# Patient Record
Sex: FEMALE | Race: WHITE | NOT HISPANIC OR LATINO | Employment: FULL TIME | URBAN - METROPOLITAN AREA
[De-identification: names, ages, dates, MRNs, and addresses within clinical notes are randomized per-mention and may not be internally consistent; named-entity substitution may affect disease eponyms.]

---

## 2023-03-28 LAB
ALANINE AMINOTRANSFERASE (SGPT) (U/L) IN SER/PLAS: 21 U/L (ref 7–45)
ALBUMIN (G/DL) IN SER/PLAS: 4.2 G/DL (ref 3.4–5)
ALKALINE PHOSPHATASE (U/L) IN SER/PLAS: 107 U/L (ref 33–110)
ANION GAP IN SER/PLAS: 13 MMOL/L (ref 10–20)
ASPARTATE AMINOTRANSFERASE (SGOT) (U/L) IN SER/PLAS: 21 U/L (ref 9–39)
BILIRUBIN TOTAL (MG/DL) IN SER/PLAS: 0.6 MG/DL (ref 0–1.2)
CALCIUM (MG/DL) IN SER/PLAS: 9.6 MG/DL (ref 8.6–10.6)
CARBON DIOXIDE, TOTAL (MMOL/L) IN SER/PLAS: 30 MMOL/L (ref 21–32)
CHLORIDE (MMOL/L) IN SER/PLAS: 101 MMOL/L (ref 98–107)
CHOLESTEROL (MG/DL) IN SER/PLAS: 181 MG/DL (ref 0–199)
CHOLESTEROL IN HDL (MG/DL) IN SER/PLAS: 54.2 MG/DL
CHOLESTEROL/HDL RATIO: 3.3
CREATININE (MG/DL) IN SER/PLAS: 0.76 MG/DL (ref 0.5–1.05)
GFR FEMALE: 90 ML/MIN/1.73M2
GLUCOSE (MG/DL) IN SER/PLAS: 118 MG/DL (ref 74–99)
LDL: 82 MG/DL (ref 0–99)
NON HDL CHOLESTEROL: 127 MG/DL
POTASSIUM (MMOL/L) IN SER/PLAS: 4.8 MMOL/L (ref 3.5–5.3)
PROTEIN TOTAL: 6.5 G/DL (ref 6.4–8.2)
SODIUM (MMOL/L) IN SER/PLAS: 139 MMOL/L (ref 136–145)
TRIGLYCERIDE (MG/DL) IN SER/PLAS: 226 MG/DL (ref 0–149)
UREA NITROGEN (MG/DL) IN SER/PLAS: 17 MG/DL (ref 6–23)
VLDL: 45 MG/DL (ref 0–40)

## 2024-01-17 DIAGNOSIS — E78.2 MIXED HYPERLIPIDEMIA: Primary | ICD-10-CM

## 2024-01-17 RX ORDER — EZETIMIBE 10 MG/1
10 TABLET ORAL DAILY
Qty: 90 TABLET | Refills: 1 | Status: SHIPPED | OUTPATIENT
Start: 2024-01-17

## 2024-02-23 PROBLEM — F41.9 ANXIETY AND DEPRESSION: Status: ACTIVE | Noted: 2024-02-23

## 2024-02-23 PROBLEM — K43.9 VENTRAL HERNIA WITHOUT OBSTRUCTION OR GANGRENE: Status: ACTIVE | Noted: 2019-02-15

## 2024-02-23 PROBLEM — G47.33 OBSTRUCTIVE SLEEP APNEA, ADULT: Status: ACTIVE | Noted: 2024-02-23

## 2024-02-23 PROBLEM — I25.10 CORONARY ARTERY DISEASE: Status: ACTIVE | Noted: 2024-02-23

## 2024-02-23 PROBLEM — I25.10 CORONARY ARTERY DISEASE: Chronic | Status: ACTIVE | Noted: 2024-02-23

## 2024-02-23 PROBLEM — I10 BENIGN ESSENTIAL HYPERTENSION: Status: ACTIVE | Noted: 2024-02-23

## 2024-02-23 PROBLEM — J30.2 SEASONAL ALLERGIES: Status: ACTIVE | Noted: 2024-02-23

## 2024-02-23 PROBLEM — E78.00 HYPERCHOLESTEROLEMIA: Status: ACTIVE | Noted: 2024-02-23

## 2024-02-23 PROBLEM — R60.9 EDEMA: Status: ACTIVE | Noted: 2024-02-23

## 2024-02-23 PROBLEM — E66.01 MORBID OBESITY (MULTI): Status: ACTIVE | Noted: 2024-02-23

## 2024-02-23 PROBLEM — E78.00 HYPERCHOLESTEROLEMIA: Chronic | Status: ACTIVE | Noted: 2024-02-23

## 2024-02-23 PROBLEM — R59.0 LYMPHADENOPATHY, AXILLARY: Status: ACTIVE | Noted: 2024-02-23

## 2024-02-23 PROBLEM — F32.A ANXIETY AND DEPRESSION: Status: ACTIVE | Noted: 2024-02-23

## 2024-02-26 ENCOUNTER — LAB (OUTPATIENT)
Dept: LAB | Facility: LAB | Age: 59
End: 2024-02-26
Payer: COMMERCIAL

## 2024-02-26 DIAGNOSIS — E78.00 HYPERCHOLESTEROLEMIA: Chronic | ICD-10-CM

## 2024-02-26 LAB
ALBUMIN SERPL BCP-MCNC: 4.1 G/DL (ref 3.4–5)
ALP SERPL-CCNC: 115 U/L (ref 33–136)
ALT SERPL W P-5'-P-CCNC: 26 U/L (ref 7–45)
ANION GAP SERPL CALC-SCNC: 16 MMOL/L (ref 10–20)
AST SERPL W P-5'-P-CCNC: 25 U/L (ref 9–39)
BILIRUB SERPL-MCNC: 0.9 MG/DL (ref 0–1.2)
BUN SERPL-MCNC: 15 MG/DL (ref 6–23)
CALCIUM SERPL-MCNC: 9.4 MG/DL (ref 8.6–10.6)
CHLORIDE SERPL-SCNC: 99 MMOL/L (ref 98–107)
CHOLEST SERPL-MCNC: 158 MG/DL (ref 0–199)
CHOLESTEROL/HDL RATIO: 3.5
CO2 SERPL-SCNC: 29 MMOL/L (ref 21–32)
CREAT SERPL-MCNC: 0.67 MG/DL (ref 0.5–1.05)
EGFRCR SERPLBLD CKD-EPI 2021: >90 ML/MIN/1.73M*2
GLUCOSE SERPL-MCNC: 112 MG/DL (ref 74–99)
HDLC SERPL-MCNC: 45.7 MG/DL
LDLC SERPL CALC-MCNC: 70 MG/DL
NON HDL CHOLESTEROL: 112 MG/DL (ref 0–149)
POTASSIUM SERPL-SCNC: 4.7 MMOL/L (ref 3.5–5.3)
PROT SERPL-MCNC: 6.4 G/DL (ref 6.4–8.2)
SODIUM SERPL-SCNC: 139 MMOL/L (ref 136–145)
TRIGL SERPL-MCNC: 211 MG/DL (ref 0–149)
VLDL: 42 MG/DL (ref 0–40)

## 2024-02-26 PROCEDURE — 36415 COLL VENOUS BLD VENIPUNCTURE: CPT

## 2024-02-26 PROCEDURE — 80053 COMPREHEN METABOLIC PANEL: CPT

## 2024-02-26 PROCEDURE — 80061 LIPID PANEL: CPT

## 2024-02-28 ENCOUNTER — OFFICE VISIT (OUTPATIENT)
Dept: CARDIOLOGY | Facility: CLINIC | Age: 59
End: 2024-02-28
Payer: COMMERCIAL

## 2024-02-28 VITALS
BODY MASS INDEX: 42.9 KG/M2 | SYSTOLIC BLOOD PRESSURE: 144 MMHG | OXYGEN SATURATION: 97 % | WEIGHT: 293 LBS | HEART RATE: 85 BPM | DIASTOLIC BLOOD PRESSURE: 84 MMHG

## 2024-02-28 DIAGNOSIS — E78.00 HYPERCHOLESTEROLEMIA: Primary | Chronic | ICD-10-CM

## 2024-02-28 DIAGNOSIS — I10 BENIGN ESSENTIAL HYPERTENSION: Chronic | ICD-10-CM

## 2024-02-28 DIAGNOSIS — I25.10 CORONARY ARTERY DISEASE INVOLVING NATIVE CORONARY ARTERY OF NATIVE HEART WITHOUT ANGINA PECTORIS: Chronic | ICD-10-CM

## 2024-02-28 PROBLEM — E66.01 MORBID OBESITY (MULTI): Chronic | Status: ACTIVE | Noted: 2024-02-23

## 2024-02-28 PROBLEM — G47.33 OBSTRUCTIVE SLEEP APNEA, ADULT: Chronic | Status: ACTIVE | Noted: 2024-02-23

## 2024-02-28 PROBLEM — J30.2 SEASONAL ALLERGIES: Status: RESOLVED | Noted: 2024-02-23 | Resolved: 2024-02-28

## 2024-02-28 PROBLEM — K43.9 VENTRAL HERNIA WITHOUT OBSTRUCTION OR GANGRENE: Status: RESOLVED | Noted: 2019-02-15 | Resolved: 2024-02-28

## 2024-02-28 PROBLEM — G47.30 SLEEP APNEA: Status: RESOLVED | Noted: 2024-02-28 | Resolved: 2024-02-28

## 2024-02-28 PROBLEM — G47.30 SLEEP APNEA: Status: ACTIVE | Noted: 2024-02-28

## 2024-02-28 PROCEDURE — 99214 OFFICE O/P EST MOD 30 MIN: CPT | Performed by: INTERNAL MEDICINE

## 2024-02-28 PROCEDURE — 3077F SYST BP >= 140 MM HG: CPT | Performed by: INTERNAL MEDICINE

## 2024-02-28 PROCEDURE — 1036F TOBACCO NON-USER: CPT | Performed by: INTERNAL MEDICINE

## 2024-02-28 PROCEDURE — 93000 ELECTROCARDIOGRAM COMPLETE: CPT | Performed by: INTERNAL MEDICINE

## 2024-02-28 PROCEDURE — 3079F DIAST BP 80-89 MM HG: CPT | Performed by: INTERNAL MEDICINE

## 2024-02-28 NOTE — PROGRESS NOTES
Referred by No ref. provider found    HPI  I am seeing Ashley for yearly follow-up.  Overall she is doing okay.  She is lacking motivation.  She is under a lot of stress taking care of her mother-in-law and her mother.  She got elected to the [employer] and [employer] board.  There are days where she sits in her house and is not motivated to do anything.  Clearly she can get out and do things.  No pain of any significance no shortness of breath beyond her baseline.  Weight is and this frustrates her.    Past Medical History:  Problem List Items Addressed This Visit    None       Past Medical History:   Diagnosis Date    Atherosclerotic heart disease of native coronary artery without angina pectoris 05/04/2022    Coronary artery disease    Coronary artery disease 02/23/2024    Elevated CAC 26 Agatston units    Hypercholesterolemia 02/23/2024    CAC 28 (80%)             Past Surgical History:  She has a past surgical history that includes Other surgical history (09/28/2020) and Other surgical history (09/09/2021).      Social History:  She has no history on file for tobacco use, alcohol use, and drug use.    Family History:  No family history on file.     Allergies:  Patient has no known allergies.    Outpatient Medications:  Current Outpatient Medications   Medication Instructions    amLODIPine (NORVASC) 5 mg, oral, Daily    atorvastatin (LIPITOR) 80 mg, oral, Daily    escitalopram (Lexapro) 20 mg tablet TAKE 1 TABLET BY MOUTH EVERY DAY    ezetimibe (ZETIA) 10 mg, oral, Daily    furosemide (Lasix) 20 mg tablet TAKE 1 TABLET BY MOUTH EVERY DAY    losartan (COZAAR) 100 mg, oral, Daily    montelukast (Singulair) 10 mg tablet TAKE 1 TABLET BY MOUTH EVERY DAY        Last Recorded Vitals:  There were no vitals filed for this visit.    Physical Exam    Physical  Patient is alert and oriented x3.  HEENT is unremarkable mucous members are moist  Neck no JVP no bruits upstrokes are full no thyromegaly  Lungs are clear  bilaterally.  No wheezing crackles or rales  Heart regular rhythm normal S1-S2 there is no S3 no murmurs are heard.  Abdomen is soft vessels are positive nontender nondistended no organomegaly no pulsatile masses  Extremities have 1+ edema.  Distal pulses present palpable.  Neuro is grossly nonfocal  Skin has no rashes     Last Labs:  CBC -  Lab Results   Component Value Date    WBC 10.1 09/16/2022    HGB 12.3 09/16/2022    HCT 38.3 09/16/2022    MCV 95 09/16/2022     09/16/2022       CMP -  Lab Results   Component Value Date    CALCIUM 9.4 02/26/2024    PROT 6.4 02/26/2024    ALBUMIN 4.1 02/26/2024    AST 25 02/26/2024    ALT 26 02/26/2024    ALKPHOS 115 02/26/2024    BILITOT 0.9 02/26/2024       LIPID PANEL -   Lab Results   Component Value Date    CHOL 158 02/26/2024    HDL 45.7 02/26/2024    CHHDL 3.5 02/26/2024    VLDL 42 (H) 02/26/2024    TRIG 211 (H) 02/26/2024    NHDL 112 02/26/2024       RENAL FUNCTION PANEL -   Lab Results   Component Value Date    K 4.7 02/26/2024       Lab Results   Component Value Date    HGBA1C 6.4 (H) 06/23/2020     Procedure  ECHO [09/28/2022]: EF 65-70%. SD = impaired relaxation pattern. AV sclerosis.     ECHO [03/02/2021]: EF 60-65%. AV sclerosis.     EX NST [03/02/2021]: 6 min 31 sec (7.70 METs) . . . Normal â€“ 75%     EX NST [11/02/2017]: 5 min 15 sec (7 METs) ... Normal â€“ 69%     TST [04/15/2014]: Positive â€“ hypertensive response to exercise w/BP rising to 210/88     CT [04/15/2014]: 6 mm probable area of focal pleural thickening along the right minor fissure. Nonspecific 4 mm ground-glass nodule RLL      CAC [04/15/2014]: Mildly elev at 26 Agatston units (80th percentile)          Assessment/Plan   1. Hypertension.  Blood pressures today.  At home they are typically in the 130 range.  I am confident with weight loss sodium restriction and exercise this will be well-controlled.    2. CAD. Mildly elevated calcium score 25 units in 2014. Continue statin and Zetia and  aspirin. Stress test 2021 normal.  No other significant cardiac symptoms     3. Hyperlipidemia. LDL is gone up from 63 up to 82. HDL is also gone from 46-54. Blood sugars are 118 and subsequently triglycerides are 226.  2/26/2024 LDL 70 HDL 46 triglycerides 211.  Her lipids are excellent.  This to be rechecked February 2025.    4. Murmur. She has aortic sclerosis there is no stenosis.     5. Shortness of breath.  Mostly related to deconditioning and weight gain.    An EKG will be done today.  Fasting lipids in February 2025.  We discussed the importance as we typically do with exercise.  Motivation is biggest issue.  She has a lot of things going on difficult to get the symptoms.  Mann Pathak MD     Instructions and follow up

## 2024-02-28 NOTE — PATIENT INSTRUCTIONS
1. Hypertension.  Blood pressures today.  At home they are typically in the 130 range.  I am confident with weight loss sodium restriction and exercise this will be well-controlled.    2. CAD. Mildly elevated calcium score 25 units in 2014. Continue statin and Zetia and aspirin. Stress test 2021 normal.  No other significant cardiac symptoms     3. Hyperlipidemia. LDL is gone up from 63 up to 82. HDL is also gone from 46-54. Blood sugars are 118 and subsequently triglycerides are 226.  2/26/2024 LDL 70 HDL 46 triglycerides 211.  Her lipids are excellent.  This to be rechecked February 2025.    4. Murmur. She has aortic sclerosis there is no stenosis.     5. Shortness of breath.  Mostly related to deconditioning and weight gain.    An EKG will be done today.  Fasting lipids in February 2025.  We discussed the importance as we typically do with exercise.  Motivation is biggest issue.  She has a lot of things going on difficult to get the symptoms.

## 2024-03-13 DIAGNOSIS — I10 HTN (HYPERTENSION), BENIGN: ICD-10-CM

## 2024-03-13 RX ORDER — FUROSEMIDE 20 MG/1
TABLET ORAL
Qty: 90 TABLET | Refills: 0 | Status: SHIPPED | OUTPATIENT
Start: 2024-03-13

## 2024-05-17 DIAGNOSIS — F41.9 ANXIETY: ICD-10-CM

## 2024-05-17 DIAGNOSIS — E78.5 HYPERLIPIDEMIA, UNSPECIFIED HYPERLIPIDEMIA TYPE: ICD-10-CM

## 2024-05-17 RX ORDER — LOSARTAN POTASSIUM 100 MG/1
100 TABLET ORAL DAILY
Qty: 90 TABLET | Refills: 1 | Status: SHIPPED | OUTPATIENT
Start: 2024-05-17

## 2024-05-17 RX ORDER — ESCITALOPRAM OXALATE 20 MG/1
TABLET ORAL
Qty: 90 TABLET | Refills: 0 | Status: SHIPPED | OUTPATIENT
Start: 2024-05-17

## 2024-05-17 RX ORDER — ATORVASTATIN CALCIUM 80 MG/1
80 TABLET, FILM COATED ORAL DAILY
Qty: 90 TABLET | Refills: 1 | Status: SHIPPED | OUTPATIENT
Start: 2024-05-17

## 2024-06-28 DIAGNOSIS — J45.20 MILD INTERMITTENT ASTHMA, UNSPECIFIED WHETHER COMPLICATED (HHS-HCC): ICD-10-CM

## 2024-06-28 RX ORDER — MONTELUKAST SODIUM 10 MG/1
TABLET ORAL
Qty: 90 TABLET | Refills: 0 | Status: SHIPPED | OUTPATIENT
Start: 2024-06-28

## 2024-07-15 DIAGNOSIS — F41.9 ANXIETY: ICD-10-CM

## 2024-07-15 DIAGNOSIS — J45.20 MILD INTERMITTENT ASTHMA, UNSPECIFIED WHETHER COMPLICATED (HHS-HCC): ICD-10-CM

## 2024-07-16 DIAGNOSIS — I10 HTN (HYPERTENSION), BENIGN: ICD-10-CM

## 2024-07-16 RX ORDER — ESCITALOPRAM OXALATE 20 MG/1
TABLET ORAL
Qty: 90 TABLET | Refills: 0 | Status: SHIPPED | OUTPATIENT
Start: 2024-07-16

## 2024-07-16 RX ORDER — MONTELUKAST SODIUM 10 MG/1
TABLET ORAL
Qty: 90 TABLET | Refills: 0 | Status: SHIPPED | OUTPATIENT
Start: 2024-07-16

## 2024-07-17 DIAGNOSIS — E78.2 MIXED HYPERLIPIDEMIA: Primary | ICD-10-CM

## 2024-07-17 DIAGNOSIS — E78.2 MIXED HYPERLIPIDEMIA: ICD-10-CM

## 2024-07-17 RX ORDER — FUROSEMIDE 20 MG/1
TABLET ORAL
Qty: 90 TABLET | Refills: 0 | Status: SHIPPED | OUTPATIENT
Start: 2024-07-17

## 2024-07-17 RX ORDER — EZETIMIBE 10 MG/1
10 TABLET ORAL DAILY
Qty: 90 TABLET | Refills: 3 | Status: SHIPPED | OUTPATIENT
Start: 2024-07-17 | End: 2024-07-17 | Stop reason: SDUPTHER

## 2024-07-18 RX ORDER — EZETIMIBE 10 MG/1
10 TABLET ORAL DAILY
Qty: 90 TABLET | Refills: 3 | Status: SHIPPED | OUTPATIENT
Start: 2024-07-18

## 2024-07-25 ENCOUNTER — APPOINTMENT (OUTPATIENT)
Dept: PRIMARY CARE | Facility: CLINIC | Age: 59
End: 2024-07-25
Payer: COMMERCIAL

## 2024-07-25 VITALS
BODY MASS INDEX: 41.95 KG/M2 | HEART RATE: 86 BPM | DIASTOLIC BLOOD PRESSURE: 72 MMHG | SYSTOLIC BLOOD PRESSURE: 125 MMHG | OXYGEN SATURATION: 92 % | HEIGHT: 70 IN | WEIGHT: 293 LBS

## 2024-07-25 DIAGNOSIS — R53.83 MALAISE AND FATIGUE: ICD-10-CM

## 2024-07-25 DIAGNOSIS — R53.81 MALAISE AND FATIGUE: ICD-10-CM

## 2024-07-25 DIAGNOSIS — Z12.31 ENCOUNTER FOR SCREENING MAMMOGRAM FOR MALIGNANT NEOPLASM OF BREAST: Primary | ICD-10-CM

## 2024-07-25 DIAGNOSIS — J40 BRONCHITIS: ICD-10-CM

## 2024-07-25 PROCEDURE — 99214 OFFICE O/P EST MOD 30 MIN: CPT | Performed by: FAMILY MEDICINE

## 2024-07-25 PROCEDURE — 3074F SYST BP LT 130 MM HG: CPT | Performed by: FAMILY MEDICINE

## 2024-07-25 PROCEDURE — 3008F BODY MASS INDEX DOCD: CPT | Performed by: FAMILY MEDICINE

## 2024-07-25 PROCEDURE — 3078F DIAST BP <80 MM HG: CPT | Performed by: FAMILY MEDICINE

## 2024-07-25 RX ORDER — ALBUTEROL SULFATE 90 UG/1
2 INHALANT RESPIRATORY (INHALATION) EVERY 4 HOURS PRN
Qty: 8 G | Refills: 3 | Status: SHIPPED | OUTPATIENT
Start: 2024-07-25 | End: 2025-07-25

## 2024-07-25 ASSESSMENT — PATIENT HEALTH QUESTIONNAIRE - PHQ9
SUM OF ALL RESPONSES TO PHQ9 QUESTIONS 1 AND 2: 4
2. FEELING DOWN, DEPRESSED OR HOPELESS: SEVERAL DAYS
8. MOVING OR SPEAKING SO SLOWLY THAT OTHER PEOPLE COULD HAVE NOTICED. OR THE OPPOSITE, BEING SO FIGETY OR RESTLESS THAT YOU HAVE BEEN MOVING AROUND A LOT MORE THAN USUAL: NEARLY EVERY DAY
5. POOR APPETITE OR OVEREATING: NEARLY EVERY DAY
6. FEELING BAD ABOUT YOURSELF - OR THAT YOU ARE A FAILURE OR HAVE LET YOURSELF OR YOUR FAMILY DOWN: SEVERAL DAYS
SUM OF ALL RESPONSES TO PHQ QUESTIONS 1-9: 17
4. FEELING TIRED OR HAVING LITTLE ENERGY: NEARLY EVERY DAY
1. LITTLE INTEREST OR PLEASURE IN DOING THINGS: NEARLY EVERY DAY
9. THOUGHTS THAT YOU WOULD BE BETTER OFF DEAD, OR OF HURTING YOURSELF: NOT AT ALL
7. TROUBLE CONCENTRATING ON THINGS, SUCH AS READING THE NEWSPAPER OR WATCHING TELEVISION: NOT AT ALL
10. IF YOU CHECKED OFF ANY PROBLEMS, HOW DIFFICULT HAVE THESE PROBLEMS MADE IT FOR YOU TO DO YOUR WORK, TAKE CARE OF THINGS AT HOME, OR GET ALONG WITH OTHER PEOPLE: VERY DIFFICULT
3. TROUBLE FALLING OR STAYING ASLEEP OR SLEEPING TOO MUCH: NEARLY EVERY DAY

## 2024-07-25 NOTE — PROGRESS NOTES
Subjective   Patient ID: Ashley Perez is a 59 y.o. female who presents for Joint Swelling.  HPI  Patient have some peripheral edema.  No chest pain shortness of breath or palpitations blood pressure looks good.  Patient needs follow-up and medications and follow-up blood work.  Review of Systems   Constitutional: Negative.    HENT: Negative.     Respiratory: Negative.     Cardiovascular:  Positive for leg swelling.   Gastrointestinal: Negative.    Genitourinary: Negative.    Musculoskeletal: Negative.    Neurological: Negative.        Objective   Physical Exam  Constitutional:       Appearance: Normal appearance.   HENT:      Head: Normocephalic and atraumatic.      Mouth/Throat:      Mouth: Mucous membranes are moist.   Eyes:      Extraocular Movements: Extraocular movements intact.      Pupils: Pupils are equal, round, and reactive to light.   Cardiovascular:      Rate and Rhythm: Normal rate and regular rhythm.   Pulmonary:      Effort: Pulmonary effort is normal.      Breath sounds: Normal breath sounds.   Musculoskeletal:      Cervical back: Normal range of motion and neck supple.      Right lower leg: Edema present.      Left lower leg: Edema present.   Skin:     General: Skin is warm and dry.   Neurological:      Mental Status: She is alert.         Assessment/Plan   Problem List Items Addressed This Visit    None  Visit Diagnoses         Codes    Encounter for screening mammogram for malignant neoplasm of breast    -  Primary Z12.31    Relevant Orders    BI mammo bilateral screening tomosynthesis    Malaise and fatigue     R53.81, R53.83    Relevant Orders    Comprehensive metabolic panel    TSH    Vitamin B12    Bronchitis     J40    Relevant Medications    albuterol (Ventolin HFA) 90 mcg/actuation inhaler                 Luke Roy DO 07/25/24 3:43 PM

## 2024-07-30 ENCOUNTER — OFFICE VISIT (OUTPATIENT)
Dept: SLEEP MEDICINE | Facility: CLINIC | Age: 59
End: 2024-07-30
Payer: COMMERCIAL

## 2024-07-30 VITALS
HEART RATE: 93 BPM | SYSTOLIC BLOOD PRESSURE: 149 MMHG | HEIGHT: 70 IN | WEIGHT: 293 LBS | OXYGEN SATURATION: 96 % | DIASTOLIC BLOOD PRESSURE: 81 MMHG | BODY MASS INDEX: 41.95 KG/M2

## 2024-07-30 DIAGNOSIS — E66.01 MORBID OBESITY (MULTI): Chronic | ICD-10-CM

## 2024-07-30 DIAGNOSIS — G47.33 OBSTRUCTIVE SLEEP APNEA, ADULT: Primary | Chronic | ICD-10-CM

## 2024-07-30 DIAGNOSIS — I10 BENIGN ESSENTIAL HYPERTENSION: Chronic | ICD-10-CM

## 2024-07-30 PROCEDURE — G2211 COMPLEX E/M VISIT ADD ON: HCPCS | Performed by: PHYSICIAN ASSISTANT

## 2024-07-30 PROCEDURE — 99214 OFFICE O/P EST MOD 30 MIN: CPT | Performed by: PHYSICIAN ASSISTANT

## 2024-07-30 PROCEDURE — 3079F DIAST BP 80-89 MM HG: CPT | Performed by: PHYSICIAN ASSISTANT

## 2024-07-30 PROCEDURE — 3077F SYST BP >= 140 MM HG: CPT | Performed by: PHYSICIAN ASSISTANT

## 2024-07-30 PROCEDURE — 1036F TOBACCO NON-USER: CPT | Performed by: PHYSICIAN ASSISTANT

## 2024-07-30 PROCEDURE — 3008F BODY MASS INDEX DOCD: CPT | Performed by: PHYSICIAN ASSISTANT

## 2024-07-30 RX ORDER — ASPIRIN 81 MG/1
81 TABLET ORAL DAILY
COMMUNITY

## 2024-07-30 SDOH — ECONOMIC STABILITY: FOOD INSECURITY: WITHIN THE PAST 12 MONTHS, THE FOOD YOU BOUGHT JUST DIDN'T LAST AND YOU DIDN'T HAVE MONEY TO GET MORE.: NEVER TRUE

## 2024-07-30 SDOH — ECONOMIC STABILITY: FOOD INSECURITY: WITHIN THE PAST 12 MONTHS, YOU WORRIED THAT YOUR FOOD WOULD RUN OUT BEFORE YOU GOT MONEY TO BUY MORE.: NEVER TRUE

## 2024-07-30 ASSESSMENT — PATIENT HEALTH QUESTIONNAIRE - PHQ9
SUM OF ALL RESPONSES TO PHQ9 QUESTIONS 1 AND 2: 1
2. FEELING DOWN, DEPRESSED OR HOPELESS: NOT AT ALL
10. IF YOU CHECKED OFF ANY PROBLEMS, HOW DIFFICULT HAVE THESE PROBLEMS MADE IT FOR YOU TO DO YOUR WORK, TAKE CARE OF THINGS AT HOME, OR GET ALONG WITH OTHER PEOPLE: SOMEWHAT DIFFICULT
1. LITTLE INTEREST OR PLEASURE IN DOING THINGS: SEVERAL DAYS

## 2024-07-30 ASSESSMENT — COLUMBIA-SUICIDE SEVERITY RATING SCALE - C-SSRS
6. HAVE YOU EVER DONE ANYTHING, STARTED TO DO ANYTHING, OR PREPARED TO DO ANYTHING TO END YOUR LIFE?: NO
2. HAVE YOU ACTUALLY HAD ANY THOUGHTS OF KILLING YOURSELF?: NO
1. IN THE PAST MONTH, HAVE YOU WISHED YOU WERE DEAD OR WISHED YOU COULD GO TO SLEEP AND NOT WAKE UP?: NO

## 2024-07-30 ASSESSMENT — LIFESTYLE VARIABLES
AUDIT-C TOTAL SCORE: 3
HOW MANY STANDARD DRINKS CONTAINING ALCOHOL DO YOU HAVE ON A TYPICAL DAY: 1 OR 2
HOW OFTEN DO YOU HAVE A DRINK CONTAINING ALCOHOL: 2-3 TIMES A WEEK
HOW OFTEN DO YOU HAVE SIX OR MORE DRINKS ON ONE OCCASION: NEVER
SKIP TO QUESTIONS 9-10: 1

## 2024-07-30 ASSESSMENT — ENCOUNTER SYMPTOMS
DEPRESSION: 0
OCCASIONAL FEELINGS OF UNSTEADINESS: 0
LOSS OF SENSATION IN FEET: 0

## 2024-07-30 ASSESSMENT — PAIN SCALES - GENERAL: PAINLEVEL: 0-NO PAIN

## 2024-07-30 NOTE — ASSESSMENT & PLAN NOTE
-doing well on pap therapy, sleep apnea well-controlled  -Update supply order  -Avoid driving /operating machinery if drowsy

## 2024-07-30 NOTE — PROGRESS NOTES
Patient: Ashley Perez    [id number]  : 1965 -- AGE 59 y.o.    Provider: Hillary Sorto PA-C     Location [facility] 1   Service Date: 2024              [facility]  Followup Visit Note    HISTORY OF PRESENT ILLNESS     HISTORY OF PRESENT ILLNESS   Ashley Perez is a 59 y.o. female with h/o ANNE-MARIE, obesity, HTN, asthma, depression, CAD  who presents to a [facility] for followup.     PAST SLEEP HISTORY:  HSAT 2022  Connie 3%: 61.6  REI4%: 44.4  O2 chuck: 61%      Assessment and plan from last visit: 2022--  OBSTRUCTIVE SLEEP APNEA - Severe  -declined titration study; may consider overnight oximetry study in future; will reassess symptoms with PAP therapy  -start 5-15cwp AutoPAP with [facility] / requested expedite due to severity   -Diet, exercise, and weight loss were emphasized today in clinic, as were non-supine sleep, avoiding alcohol in the late evening, and driving or operating heavy machinery when sleepy.     HYPERTENSION  -Blood pressure today: 138/84  -Denies CP, SOB, unusual HA, vision changes, dizziness or other symptoms   -Last Echo: EF 65-70% in 2022  -Continue f/u with PCP     OBESITY  -BMI: 41.77  -most recent bicarb: 27 2022  -encouraged healthy diet/exercise  -consider weight management referral at future visit     RTC 31-90 days after PAP set up         Current History    On today's visit, the patient reports sheforgot to follow-up with me after starting cpap in early . She reports she is doing very well at this time.  She is using nasal pillow mask.  She reports that she no longer snoring using CPAP.  She reports that she does have better daytime energy and no longer requiring a daily nap.  Denies any significant continue with CPAP therapy.  Denies any other sleep-related concerns as a visit.  Reports she needs a new supply order.    Sleep schedule:   Bed time: 1-2A  Sleep latency: quickly  "  Nocturnal Awakenings: 0-1 - back to sleep OK   Wake up:10-11A  TST: 8-9 hours     Naps: none       ESS:  3  ROSY:  4  FOSQ: 36    REVIEW OF SYSTEMS     REVIEW OF SYSTEMS  See HPI; all other ROS were reviewed and negative for compliant      ALLERGIES AND MEDICATIONS     ALLERGIES  No Known Allergies    MEDICATIONS: She has a current medication list which includes the following prescription(s): albuterol - Inhale 2 puffs every 4 hours if needed for wheezing or shortness of breath, amlodipine - TAKE 1 TABLET BY MOUTH EVERY DAY, aspirin - Take 1 tablet (81 mg) by mouth once daily, atorvastatin - TAKE 1 TABLET BY MOUTH EVERY DAY, escitalopram - TAKE 1 TABLET BY MOUTH EVERY DAY, ezetimibe - Take 1 tablet (10 mg) by mouth once daily, furosemide - TAKE 1 TABLET BY MOUTH EVERY DAY, losartan - TAKE 1 TABLET BY MOUTH EVERY DAY, and montelukast - TAKE 1 TABLET BY MOUTH EVERY DAY.    PAST MEDICAL HISTORY : She  has a past medical history of Atherosclerotic heart disease of native coronary artery without angina pectoris (05/04/2022), Benign essential hypertension (02/23/2024), Coronary artery disease (02/23/2024), Hypercholesterolemia (02/23/2024), Morbid obesity (Multi) (02/23/2024), and Obstructive sleep apnea, adult (02/23/2024).    PAST SURGICAL HISTORY: She  has a past surgical history that includes Other surgical history (09/28/2020) and Other surgical history (09/09/2021).     FAMILY HISTORY: No changes since previous visit. Otherwise non-contributory as charted.     SOCIAL HISTORY  She  reports that she has never smoked. She has never been exposed to tobacco smoke. She has never used smokeless tobacco. No history on file for alcohol use and drug use.       PHYSICAL EXAM     VITAL SIGNS: /81   Pulse 93   Ht 1.778 m (5' 10\")   Wt 137 kg (301 lb)   SpO2 96%   BMI 43.19 kg/m²        PREVIOUS WEIGHTS:  Wt Readings from Last 3 Encounters:   07/30/24 137 kg (301 lb)   07/25/24 137 kg (301 lb)   02/28/24 136 kg (299 " lb)       Constitutional: Alert and oriented, cooperative, no acute distress  Head: Normocephalic, atraumatic   Cranial Features: No abnormal craniofacial features  Neck: Supple. Trachea midline.  Pulmonary: Non-labored breathing, speaks in full sentences. No cough.    Cardiac: regular rate   Extremities: No clubbing, no edema  Neuromuscular: Cranial nerves grossly intact, no focal deficits      RESULTS/DATA     Bicarbonate (mmol/L)   Date Value   02/26/2024 29   03/27/2023 30   09/16/2022 27   08/22/2022 27       PAP Adherence  A PAP adherence download was obtained and data was reviewed personally today in clinic.        ASSESSMENT/PLAN     Ms. Perez is a 59 y.o. female and she returns in followup to the [facility] for ANNE-MARIE.    Problem List, Orders, Assessment, Recommendations:  Problem List Items Addressed This Visit             ICD-10-CM    Benign essential hypertension (Chronic) I10     -149/81 today  -asymptomatic  -continue close pcp follow up         Morbid obesity (Multi) (Chronic) E66.01     -discussed link to ANNE-MARIE  -encourage healthy diet/exercise         Obstructive sleep apnea, adult - Primary (Chronic) G47.33     -doing well on pap therapy, sleep apnea well-controlled  -Update supply order  -Avoid driving /operating machinery if drowsy         Relevant Orders    Positive Airway Pressure (PAP) Therapy       Disposition    Return to clinic in 4 months  -compliance check

## 2024-08-05 ASSESSMENT — ENCOUNTER SYMPTOMS
GASTROINTESTINAL NEGATIVE: 1
NEUROLOGICAL NEGATIVE: 1
MUSCULOSKELETAL NEGATIVE: 1
RESPIRATORY NEGATIVE: 1
CONSTITUTIONAL NEGATIVE: 1

## 2024-08-06 ENCOUNTER — LAB (OUTPATIENT)
Dept: LAB | Facility: LAB | Age: 59
End: 2024-08-06
Payer: COMMERCIAL

## 2024-08-06 DIAGNOSIS — R53.81 MALAISE AND FATIGUE: ICD-10-CM

## 2024-08-06 DIAGNOSIS — R53.83 MALAISE AND FATIGUE: ICD-10-CM

## 2024-08-06 PROCEDURE — 36415 COLL VENOUS BLD VENIPUNCTURE: CPT

## 2024-08-06 PROCEDURE — 84443 ASSAY THYROID STIM HORMONE: CPT

## 2024-08-06 PROCEDURE — 80053 COMPREHEN METABOLIC PANEL: CPT

## 2024-08-06 PROCEDURE — 82607 VITAMIN B-12: CPT

## 2024-08-07 LAB
ALBUMIN SERPL BCP-MCNC: 4.3 G/DL (ref 3.4–5)
ALP SERPL-CCNC: 126 U/L (ref 33–136)
ALT SERPL W P-5'-P-CCNC: 24 U/L (ref 7–45)
ANION GAP SERPL CALC-SCNC: 20 MMOL/L (ref 10–20)
AST SERPL W P-5'-P-CCNC: 39 U/L (ref 9–39)
BILIRUB SERPL-MCNC: 0.6 MG/DL (ref 0–1.2)
BUN SERPL-MCNC: 20 MG/DL (ref 6–23)
CALCIUM SERPL-MCNC: 9.7 MG/DL (ref 8.6–10.6)
CHLORIDE SERPL-SCNC: 95 MMOL/L (ref 98–107)
CO2 SERPL-SCNC: 31 MMOL/L (ref 21–32)
CREAT SERPL-MCNC: 0.74 MG/DL (ref 0.5–1.05)
EGFRCR SERPLBLD CKD-EPI 2021: >90 ML/MIN/1.73M*2
GLUCOSE SERPL-MCNC: 115 MG/DL (ref 74–99)
POTASSIUM SERPL-SCNC: 5.2 MMOL/L (ref 3.5–5.3)
PROT SERPL-MCNC: 6.9 G/DL (ref 6.4–8.2)
SODIUM SERPL-SCNC: 141 MMOL/L (ref 136–145)
TSH SERPL-ACNC: 1.41 MIU/L (ref 0.44–3.98)
VIT B12 SERPL-MCNC: 268 PG/ML (ref 211–911)

## 2024-09-05 ENCOUNTER — APPOINTMENT (OUTPATIENT)
Dept: PRIMARY CARE | Facility: CLINIC | Age: 59
End: 2024-09-05
Payer: COMMERCIAL

## 2024-09-13 ENCOUNTER — APPOINTMENT (OUTPATIENT)
Dept: PRIMARY CARE | Facility: CLINIC | Age: 59
End: 2024-09-13
Payer: COMMERCIAL

## 2024-09-16 ENCOUNTER — APPOINTMENT (OUTPATIENT)
Dept: PRIMARY CARE | Facility: CLINIC | Age: 59
End: 2024-09-16
Payer: COMMERCIAL

## 2024-09-16 VITALS
WEIGHT: 293 LBS | HEIGHT: 70 IN | BODY MASS INDEX: 41.95 KG/M2 | DIASTOLIC BLOOD PRESSURE: 70 MMHG | HEART RATE: 80 BPM | SYSTOLIC BLOOD PRESSURE: 123 MMHG

## 2024-09-16 DIAGNOSIS — R73.9 HYPERGLYCEMIA: Primary | ICD-10-CM

## 2024-09-16 DIAGNOSIS — J44.9 CHRONIC OBSTRUCTIVE PULMONARY DISEASE, UNSPECIFIED COPD TYPE (MULTI): ICD-10-CM

## 2024-09-16 DIAGNOSIS — E66.01 MORBID OBESITY (MULTI): Chronic | ICD-10-CM

## 2024-09-16 DIAGNOSIS — Z12.11 ENCOUNTER FOR SCREENING FOR MALIGNANT NEOPLASM OF COLON: ICD-10-CM

## 2024-09-16 DIAGNOSIS — R60.0 EDEMA, PERIPHERAL: ICD-10-CM

## 2024-09-16 PROCEDURE — 1036F TOBACCO NON-USER: CPT | Performed by: FAMILY MEDICINE

## 2024-09-16 PROCEDURE — 3008F BODY MASS INDEX DOCD: CPT | Performed by: FAMILY MEDICINE

## 2024-09-16 PROCEDURE — 3074F SYST BP LT 130 MM HG: CPT | Performed by: FAMILY MEDICINE

## 2024-09-16 PROCEDURE — 99214 OFFICE O/P EST MOD 30 MIN: CPT | Performed by: FAMILY MEDICINE

## 2024-09-16 PROCEDURE — 3078F DIAST BP <80 MM HG: CPT | Performed by: FAMILY MEDICINE

## 2024-09-16 RX ORDER — SEMAGLUTIDE 0.25 MG/.5ML
0.25 INJECTION, SOLUTION SUBCUTANEOUS WEEKLY
Qty: 2 ML | Refills: 0 | Status: SHIPPED | OUTPATIENT
Start: 2024-09-16 | End: 2024-10-08

## 2024-09-16 RX ORDER — FLUTICASONE PROPIONATE AND SALMETEROL 250; 50 UG/1; UG/1
1 POWDER RESPIRATORY (INHALATION)
Qty: 60 EACH | Refills: 11 | Status: SHIPPED | OUTPATIENT
Start: 2024-09-16 | End: 2024-09-16 | Stop reason: WASHOUT

## 2024-09-16 RX ORDER — FUROSEMIDE 40 MG/1
40 TABLET ORAL DAILY
Qty: 90 TABLET | Refills: 1 | Status: SHIPPED | OUTPATIENT
Start: 2024-09-16 | End: 2025-03-15

## 2024-09-16 ASSESSMENT — ENCOUNTER SYMPTOMS
MUSCULOSKELETAL NEGATIVE: 1
EYES NEGATIVE: 1
RESPIRATORY NEGATIVE: 1
NEUROLOGICAL NEGATIVE: 1
CARDIOVASCULAR NEGATIVE: 1
CONSTITUTIONAL NEGATIVE: 1

## 2024-09-16 ASSESSMENT — LIFESTYLE VARIABLES: HOW OFTEN DO YOU HAVE A DRINK CONTAINING ALCOHOL: 2-4 TIMES A MONTH

## 2024-09-16 ASSESSMENT — COLUMBIA-SUICIDE SEVERITY RATING SCALE - C-SSRS
2. HAVE YOU ACTUALLY HAD ANY THOUGHTS OF KILLING YOURSELF?: NO
1. IN THE PAST MONTH, HAVE YOU WISHED YOU WERE DEAD OR WISHED YOU COULD GO TO SLEEP AND NOT WAKE UP?: NO
6. HAVE YOU EVER DONE ANYTHING, STARTED TO DO ANYTHING, OR PREPARED TO DO ANYTHING TO END YOUR LIFE?: NO

## 2024-09-16 ASSESSMENT — PATIENT HEALTH QUESTIONNAIRE - PHQ9
SUM OF ALL RESPONSES TO PHQ9 QUESTIONS 1 AND 2: 0
2. FEELING DOWN, DEPRESSED OR HOPELESS: NOT AT ALL
1. LITTLE INTEREST OR PLEASURE IN DOING THINGS: NOT AT ALL

## 2024-09-16 NOTE — PROGRESS NOTES
Subjective   Patient ID: Ashley Perez is a 59 y.o. female who presents for Follow-up (Pt is here for BW F/U ).  HPI  Patient has decrease in her peripheral edema swelling is significantly lessened.  Patient continues to take Lasix 40 mg followed up with cardiology previously seems to be doing okay in that venue.  Patient has hyperglycemia with a BMI over 40 we will get a try and see if we get Mounjaro approved see if we can to help with the elevated sugars as well as decrease some of her weight think this is causing problems with her congestive failure as well as with her chronic peripheral edema.  Review of Systems   Constitutional: Negative.    HENT: Negative.     Eyes: Negative.    Respiratory: Negative.     Cardiovascular: Negative.    Genitourinary: Negative.    Musculoskeletal: Negative.    Skin: Negative.    Neurological: Negative.        Objective   Physical Exam  Constitutional:       Appearance: Normal appearance.   HENT:      Head: Normocephalic.      Mouth/Throat:      Mouth: Mucous membranes are moist.   Cardiovascular:      Rate and Rhythm: Normal rate and regular rhythm.   Pulmonary:      Effort: Pulmonary effort is normal.   Musculoskeletal:         General: Normal range of motion.   Neurological:      Mental Status: She is alert.         Assessment/Plan   Problem List Items Addressed This Visit             ICD-10-CM    Morbid obesity (Multi) (Chronic) E66.01    Relevant Medications    semaglutide, weight loss, (Wegovy) 0.25 mg/0.5 mL pen injector     Other Visit Diagnoses         Codes    Hyperglycemia    -  Primary R73.9    Relevant Medications    semaglutide, weight loss, (Wegovy) 0.25 mg/0.5 mL pen injector    Other Relevant Orders    Hemoglobin A1C    Edema, peripheral     R60.9    Relevant Medications    furosemide (Lasix) 40 mg tablet                 Luke Roy DO 09/16/24 3:03 PM

## 2024-09-19 ENCOUNTER — LAB (OUTPATIENT)
Dept: LAB | Facility: LAB | Age: 59
End: 2024-09-19
Payer: COMMERCIAL

## 2024-09-19 ENCOUNTER — HOSPITAL ENCOUNTER (OUTPATIENT)
Dept: RADIOLOGY | Facility: CLINIC | Age: 59
Discharge: HOME | End: 2024-09-19
Payer: COMMERCIAL

## 2024-09-19 VITALS — WEIGHT: 293 LBS | BODY MASS INDEX: 41.95 KG/M2 | HEIGHT: 70 IN

## 2024-09-19 DIAGNOSIS — R73.9 HYPERGLYCEMIA: ICD-10-CM

## 2024-09-19 DIAGNOSIS — Z12.31 ENCOUNTER FOR SCREENING MAMMOGRAM FOR MALIGNANT NEOPLASM OF BREAST: ICD-10-CM

## 2024-09-19 PROCEDURE — 36415 COLL VENOUS BLD VENIPUNCTURE: CPT

## 2024-09-19 PROCEDURE — 83036 HEMOGLOBIN GLYCOSYLATED A1C: CPT

## 2024-09-19 PROCEDURE — 77067 SCR MAMMO BI INCL CAD: CPT

## 2024-09-20 LAB
EST. AVERAGE GLUCOSE BLD GHB EST-MCNC: 137 MG/DL
HBA1C MFR BLD: 6.4 %

## 2024-10-13 DIAGNOSIS — I10 HTN (HYPERTENSION), BENIGN: ICD-10-CM

## 2024-10-14 RX ORDER — FUROSEMIDE 20 MG/1
TABLET ORAL
Qty: 90 TABLET | Refills: 0 | Status: SHIPPED | OUTPATIENT
Start: 2024-10-14

## 2024-10-17 ENCOUNTER — ANESTHESIA EVENT (OUTPATIENT)
Dept: GASTROENTEROLOGY | Facility: EXTERNAL LOCATION | Age: 59
End: 2024-10-17

## 2024-10-21 DIAGNOSIS — I10 HYPERTENSION, UNSPECIFIED TYPE: ICD-10-CM

## 2024-10-22 RX ORDER — AMLODIPINE BESYLATE 5 MG/1
5 TABLET ORAL DAILY
Qty: 90 TABLET | Refills: 3 | Status: SHIPPED | OUTPATIENT
Start: 2024-10-22

## 2024-10-28 ENCOUNTER — ANESTHESIA (OUTPATIENT)
Dept: GASTROENTEROLOGY | Facility: EXTERNAL LOCATION | Age: 59
End: 2024-10-28

## 2024-10-28 ENCOUNTER — APPOINTMENT (OUTPATIENT)
Dept: GASTROENTEROLOGY | Facility: EXTERNAL LOCATION | Age: 59
End: 2024-10-28
Payer: COMMERCIAL

## 2024-10-28 VITALS
BODY MASS INDEX: 41.95 KG/M2 | SYSTOLIC BLOOD PRESSURE: 116 MMHG | TEMPERATURE: 98.4 F | RESPIRATION RATE: 17 BRPM | OXYGEN SATURATION: 94 % | DIASTOLIC BLOOD PRESSURE: 50 MMHG | HEART RATE: 72 BPM | HEIGHT: 70 IN | WEIGHT: 293 LBS

## 2024-10-28 DIAGNOSIS — Z12.11 ENCOUNTER FOR SCREENING FOR MALIGNANT NEOPLASM OF COLON: Primary | ICD-10-CM

## 2024-10-28 PROCEDURE — 45378 DIAGNOSTIC COLONOSCOPY: CPT | Performed by: INTERNAL MEDICINE

## 2024-10-28 RX ORDER — LIDOCAINE HYDROCHLORIDE 20 MG/ML
INJECTION, SOLUTION INFILTRATION; PERINEURAL AS NEEDED
Status: DISCONTINUED | OUTPATIENT
Start: 2024-10-28 | End: 2024-10-28

## 2024-10-28 RX ORDER — PROPOFOL 10 MG/ML
INJECTION, EMULSION INTRAVENOUS AS NEEDED
Status: DISCONTINUED | OUTPATIENT
Start: 2024-10-28 | End: 2024-10-28

## 2024-10-28 RX ORDER — ALBUTEROL SULFATE 0.83 MG/ML
SOLUTION RESPIRATORY (INHALATION) AS NEEDED
Status: DISCONTINUED | OUTPATIENT
Start: 2024-10-28 | End: 2024-10-28

## 2024-10-28 SDOH — HEALTH STABILITY: MENTAL HEALTH: CURRENT SMOKER: 0

## 2024-10-28 ASSESSMENT — PAIN - FUNCTIONAL ASSESSMENT
PAIN_FUNCTIONAL_ASSESSMENT: 0-10

## 2024-10-28 ASSESSMENT — PAIN SCALES - GENERAL
PAINLEVEL_OUTOF10: 0 - NO PAIN
PAINLEVEL_OUTOF10: 0 - NO PAIN
PAIN_LEVEL: 0
PAINLEVEL_OUTOF10: 0 - NO PAIN

## 2024-11-13 DIAGNOSIS — F41.9 ANXIETY: ICD-10-CM

## 2024-11-13 DIAGNOSIS — E78.5 HYPERLIPIDEMIA, UNSPECIFIED HYPERLIPIDEMIA TYPE: ICD-10-CM

## 2024-11-14 RX ORDER — ATORVASTATIN CALCIUM 80 MG/1
80 TABLET, FILM COATED ORAL DAILY
Qty: 90 TABLET | Refills: 1 | Status: SHIPPED | OUTPATIENT
Start: 2024-11-14

## 2024-11-14 RX ORDER — LOSARTAN POTASSIUM 100 MG/1
100 TABLET ORAL DAILY
Qty: 90 TABLET | Refills: 1 | Status: SHIPPED | OUTPATIENT
Start: 2024-11-14

## 2024-11-14 RX ORDER — ESCITALOPRAM OXALATE 20 MG/1
TABLET ORAL
Qty: 90 TABLET | Refills: 0 | Status: SHIPPED | OUTPATIENT
Start: 2024-11-14

## 2024-12-03 ENCOUNTER — OFFICE VISIT (OUTPATIENT)
Dept: SLEEP MEDICINE | Facility: CLINIC | Age: 59
End: 2024-12-03
Payer: COMMERCIAL

## 2024-12-03 VITALS
RESPIRATION RATE: 18 BRPM | DIASTOLIC BLOOD PRESSURE: 73 MMHG | TEMPERATURE: 98.2 F | HEIGHT: 70 IN | HEART RATE: 85 BPM | WEIGHT: 293 LBS | BODY MASS INDEX: 41.95 KG/M2 | SYSTOLIC BLOOD PRESSURE: 137 MMHG | OXYGEN SATURATION: 93 %

## 2024-12-03 DIAGNOSIS — G47.33 OBSTRUCTIVE SLEEP APNEA, ADULT: Primary | Chronic | ICD-10-CM

## 2024-12-03 DIAGNOSIS — I10 BENIGN ESSENTIAL HYPERTENSION: Chronic | ICD-10-CM

## 2024-12-03 PROCEDURE — 99213 OFFICE O/P EST LOW 20 MIN: CPT | Performed by: PHYSICIAN ASSISTANT

## 2024-12-03 PROCEDURE — 3075F SYST BP GE 130 - 139MM HG: CPT | Performed by: PHYSICIAN ASSISTANT

## 2024-12-03 PROCEDURE — 1036F TOBACCO NON-USER: CPT | Performed by: PHYSICIAN ASSISTANT

## 2024-12-03 PROCEDURE — 3078F DIAST BP <80 MM HG: CPT | Performed by: PHYSICIAN ASSISTANT

## 2024-12-03 PROCEDURE — 3008F BODY MASS INDEX DOCD: CPT | Performed by: PHYSICIAN ASSISTANT

## 2024-12-03 SDOH — ECONOMIC STABILITY: FOOD INSECURITY: WITHIN THE PAST 12 MONTHS, YOU WORRIED THAT YOUR FOOD WOULD RUN OUT BEFORE YOU GOT MONEY TO BUY MORE.: NEVER TRUE

## 2024-12-03 SDOH — ECONOMIC STABILITY: FOOD INSECURITY: WITHIN THE PAST 12 MONTHS, THE FOOD YOU BOUGHT JUST DIDN'T LAST AND YOU DIDN'T HAVE MONEY TO GET MORE.: NEVER TRUE

## 2024-12-03 ASSESSMENT — ENCOUNTER SYMPTOMS
DEPRESSION: 0
OCCASIONAL FEELINGS OF UNSTEADINESS: 0
LOSS OF SENSATION IN FEET: 1

## 2024-12-03 ASSESSMENT — LIFESTYLE VARIABLES
HOW OFTEN DO YOU HAVE SIX OR MORE DRINKS ON ONE OCCASION: NEVER
SKIP TO QUESTIONS 9-10: 1
HOW MANY STANDARD DRINKS CONTAINING ALCOHOL DO YOU HAVE ON A TYPICAL DAY: 1 OR 2
AUDIT-C TOTAL SCORE: 3
HOW OFTEN DO YOU HAVE A DRINK CONTAINING ALCOHOL: 2-3 TIMES A WEEK

## 2024-12-03 ASSESSMENT — COLUMBIA-SUICIDE SEVERITY RATING SCALE - C-SSRS
6. HAVE YOU EVER DONE ANYTHING, STARTED TO DO ANYTHING, OR PREPARED TO DO ANYTHING TO END YOUR LIFE?: NO
1. IN THE PAST MONTH, HAVE YOU WISHED YOU WERE DEAD OR WISHED YOU COULD GO TO SLEEP AND NOT WAKE UP?: NO
2. HAVE YOU ACTUALLY HAD ANY THOUGHTS OF KILLING YOURSELF?: NO

## 2024-12-03 ASSESSMENT — PAIN SCALES - GENERAL: PAINLEVEL_OUTOF10: 0-NO PAIN

## 2024-12-03 NOTE — ASSESSMENT & PLAN NOTE
-elevated today, improved on re-check  -asymptomatic  -encourage close follow up with pcp/cardiology

## 2024-12-03 NOTE — PROGRESS NOTES
" Patient: Ashley Perez    [id number]  : 1965 -- AGE 59 y.o.    Provider: Hillary Sorto PA-C     Location [facility] 1   Service Date: 12/3/2024              [facility]  Followup Visit Note    HISTORY OF PRESENT ILLNESS     HISTORY OF PRESENT ILLNESS   Ashley Perez is a 59 y.o. female with h/o  ANNE-MARIE, obesity, HTN, asthma, depression, CAD  who presents to a [facility] for followup.     PAST SLEEP HISTORY:  HSAT 2022  Connie 3%: 61.6  REI4%: 44.4  O2 chuck: 61%    Assessment and plan from last visit: 2024--   Benign essential hypertension (Chronic) I10        -149/81 today  -asymptomatic  -continue close pcp follow up           Morbid obesity (Multi) (Chronic) E66.01       -discussed link to ANNE-MARIE  -encourage healthy diet/exercise           Obstructive sleep apnea, adult - Primary (Chronic) G47.33       -doing well on pap therapy, sleep apnea well-controlled  -Update supply order  -Avoid driving /operating machinery if drowsy           Relevant Orders     Positive Airway Pressure (PAP) Therapy           Current History    On today's visit, the patient reports:   Benefits with cpap, same as last visit: \"he is using nasal pillow mask. She reports that she no longer snoring using CPAP. She reports that she does have better daytime energy and no longer requiring a daily nap\"  Reports continues to have bad habits, stays up late, may be on computer late and go down a rabit hole. Knows she needs to get better about her routine. Wants to shift her sleep schedule. Motivated to meet compliance, thought she had been doing so.       Sleep schedule:   Bed time: 1-2A  Sleep latency: quickly   Nocturnal Awakenings: 0-1 - back to sleep OK   Wake up:10-11A  TST: 8-9 hours      Naps: none       ESS:  5  ROSY:  7  FOSQ: 37    REVIEW OF SYSTEMS     REVIEW OF SYSTEMS  See HPI; all other ROS were reviewed and negative for " compliant      ALLERGIES AND MEDICATIONS     ALLERGIES  No Known Allergies    MEDICATIONS: She has a current medication list which includes the following prescription(s): albuterol - Inhale 2 puffs every 4 hours if needed for wheezing or shortness of breath, amlodipine - TAKE 1 TABLET BY MOUTH EVERY DAY, aspirin - Take 1 tablet (81 mg) by mouth once daily, atorvastatin - TAKE 1 TABLET BY MOUTH EVERY DAY, escitalopram - TAKE 1 TABLET BY MOUTH EVERY DAY, ezetimibe - Take 1 tablet (10 mg) by mouth once daily, furosemide - Take 1 tablet (40 mg) by mouth once daily, losartan - TAKE 1 TABLET BY MOUTH EVERY DAY, montelukast - TAKE 1 TABLET BY MOUTH EVERY DAY, and wegovy - Inject 0.25 mg under the skin 1 (one) time per week for 4 doses.    PAST MEDICAL HISTORY : She  has a past medical history of Atherosclerotic heart disease of native coronary artery without angina pectoris (05/04/2022), Benign essential hypertension (02/23/2024), Coronary artery disease (02/23/2024), Hypercholesterolemia (02/23/2024), Morbid obesity (Multi) (02/23/2024), and Obstructive sleep apnea, adult (02/23/2024).    PAST SURGICAL HISTORY: She  has a past surgical history that includes Other surgical history (09/28/2020) and Other surgical history (09/09/2021).     FAMILY HISTORY: No changes since previous visit. Otherwise non-contributory as charted.     SOCIAL HISTORY  She  reports that she has never smoked. She has never been exposed to tobacco smoke. She has never used smokeless tobacco. She reports that she does not use drugs. No history on file for alcohol use.       PHYSICAL EXAM     VITAL SIGNS: There were no vitals taken for this visit.       PREVIOUS WEIGHTS:  Wt Readings from Last 3 Encounters:   10/28/24 134 kg (296 lb)   09/19/24 136 kg (300 lb)   09/16/24 133 kg (294 lb)       Constitutional: Alert and oriented, cooperative, no acute distress  Head: Normocephalic, atraumatic   Cranial Features: No abnormal craniofacial features  Neck:  Supple. Trachea midline.  Pulmonary: Non-labored breathing, speaks in full sentences. No cough.    Cardiac: regular rate   Extremities: No clubbing, no edema  Neuromuscular: Cranial nerves grossly intact, no focal deficits      RESULTS/DATA     Bicarbonate (mmol/L)   Date Value   08/06/2024 31   02/26/2024 29   03/27/2023 30   09/16/2022 27   08/22/2022 27       PAP Adherence  A PAP adherence download was obtained and data was reviewed personally today in clinic.        ASSESSMENT/PLAN     Ms. Perez is a 59 y.o. female and she returns in followup to the [facility] for ANNE-MARIE.    Problem List, Orders, Assessment, Recommendations:  Problem List Items Addressed This Visit             ICD-10-CM    Benign essential hypertension (Chronic) I10     -elevated today, improved on re-check  -asymptomatic  -encourage close follow up with pcp/cardiology         Obstructive sleep apnea, adult - Primary (Chronic) G47.33     -doing well on pap therapy, sleep apnea well-controlled  -Update supply order  -Avoid driving /operating machinery if drowsy  -reviewed compliance guidelines today; discussed setting a reminder alarm/ consistent bedtime routine, she is going to work on this          Relevant Orders    Positive Airway Pressure (PAP) Therapy       Disposition    Return to clinic in 6 months

## 2024-12-03 NOTE — ASSESSMENT & PLAN NOTE
-doing well on pap therapy, sleep apnea well-controlled  -Update supply order  -Avoid driving /operating machinery if drowsy  -reviewed compliance guidelines today; discussed setting a reminder alarm/ consistent bedtime routine, she is going to work on this

## 2024-12-23 DIAGNOSIS — J45.20 MILD INTERMITTENT ASTHMA, UNSPECIFIED WHETHER COMPLICATED (HHS-HCC): ICD-10-CM

## 2024-12-23 RX ORDER — MONTELUKAST SODIUM 10 MG/1
TABLET ORAL
Qty: 90 TABLET | Refills: 0 | Status: SHIPPED | OUTPATIENT
Start: 2024-12-23

## 2025-02-15 DIAGNOSIS — F41.9 ANXIETY: ICD-10-CM

## 2025-02-17 RX ORDER — ESCITALOPRAM OXALATE 20 MG/1
TABLET ORAL
Qty: 90 TABLET | Refills: 0 | Status: SHIPPED | OUTPATIENT
Start: 2025-02-17

## 2025-02-26 LAB
ALBUMIN SERPL-MCNC: 4.2 G/DL (ref 3.6–5.1)
ALP SERPL-CCNC: 136 U/L (ref 37–153)
ALT SERPL-CCNC: 17 U/L (ref 6–29)
ANION GAP SERPL CALCULATED.4IONS-SCNC: 11 MMOL/L (CALC) (ref 7–17)
AST SERPL-CCNC: 23 U/L (ref 10–35)
BILIRUB SERPL-MCNC: 0.8 MG/DL (ref 0.2–1.2)
BUN SERPL-MCNC: 26 MG/DL (ref 7–25)
CALCIUM SERPL-MCNC: 9.1 MG/DL (ref 8.6–10.4)
CHLORIDE SERPL-SCNC: 95 MMOL/L (ref 98–110)
CHOLEST SERPL-MCNC: 169 MG/DL
CHOLEST/HDLC SERPL: 2.9 (CALC)
CO2 SERPL-SCNC: 32 MMOL/L (ref 20–32)
CREAT SERPL-MCNC: 0.82 MG/DL (ref 0.5–1.05)
EGFRCR SERPLBLD CKD-EPI 2021: 81 ML/MIN/1.73M2
GLUCOSE SERPL-MCNC: 103 MG/DL (ref 65–99)
HDLC SERPL-MCNC: 58 MG/DL
LDLC SERPL CALC-MCNC: 75 MG/DL (CALC)
NONHDLC SERPL-MCNC: 111 MG/DL (CALC)
POTASSIUM SERPL-SCNC: 4.8 MMOL/L (ref 3.5–5.3)
PROT SERPL-MCNC: 6.8 G/DL (ref 6.1–8.1)
SODIUM SERPL-SCNC: 138 MMOL/L (ref 135–146)
TRIGL SERPL-MCNC: 273 MG/DL

## 2025-03-03 ENCOUNTER — APPOINTMENT (OUTPATIENT)
Dept: CARDIOLOGY | Facility: CLINIC | Age: 60
End: 2025-03-03
Payer: COMMERCIAL

## 2025-03-03 VITALS
OXYGEN SATURATION: 95 % | BODY MASS INDEX: 43.33 KG/M2 | SYSTOLIC BLOOD PRESSURE: 138 MMHG | WEIGHT: 293 LBS | HEART RATE: 102 BPM | DIASTOLIC BLOOD PRESSURE: 84 MMHG

## 2025-03-03 DIAGNOSIS — E78.00 HYPERCHOLESTEROLEMIA: Chronic | ICD-10-CM

## 2025-03-03 DIAGNOSIS — I10 BENIGN ESSENTIAL HYPERTENSION: Primary | Chronic | ICD-10-CM

## 2025-03-03 DIAGNOSIS — I25.10 CORONARY ARTERY DISEASE INVOLVING NATIVE CORONARY ARTERY OF NATIVE HEART WITHOUT ANGINA PECTORIS: Chronic | ICD-10-CM

## 2025-03-03 LAB
ATRIAL RATE: 84 BPM
P AXIS: 36 DEGREES
P OFFSET: 208 MS
P ONSET: 147 MS
PR INTERVAL: 158 MS
Q ONSET: 226 MS
QRS COUNT: 13 BEATS
QRS DURATION: 74 MS
QT INTERVAL: 354 MS
QTC CALCULATION(BAZETT): 418 MS
QTC FREDERICIA: 395 MS
R AXIS: 49 DEGREES
T AXIS: 49 DEGREES
T OFFSET: 403 MS
VENTRICULAR RATE: 84 BPM

## 2025-03-03 PROCEDURE — 99213 OFFICE O/P EST LOW 20 MIN: CPT | Performed by: INTERNAL MEDICINE

## 2025-03-03 PROCEDURE — 3075F SYST BP GE 130 - 139MM HG: CPT | Performed by: INTERNAL MEDICINE

## 2025-03-03 PROCEDURE — 93005 ELECTROCARDIOGRAM TRACING: CPT | Performed by: INTERNAL MEDICINE

## 2025-03-03 PROCEDURE — 3079F DIAST BP 80-89 MM HG: CPT | Performed by: INTERNAL MEDICINE

## 2025-03-03 NOTE — PROGRESS NOTES
'Referred by No ref. provider found    HPI  I am seeing Ashley for yearly follow-up.  Overall she is doing okay.  She still lacking motivation.  She wants to start exercising but just does not.  When she does go up and down stairs her legs feel heavy and she feels short of breath and that concerns her.  She is now seeing a therapist.  Past Medical History:  Problem List Items Addressed This Visit    None     Past Medical History:   Diagnosis Date    Atherosclerotic heart disease of native coronary artery without angina pectoris 05/04/2022    Coronary artery disease    Benign essential hypertension 02/23/2024    Coronary artery disease 02/23/2024    Elevated CAC 26 Agatston units    Hypercholesterolemia 02/23/2024    CAC 28 (80%)    Morbid obesity (Multi) 02/23/2024    Obstructive sleep apnea, adult 02/23/2024      Past Surgical History:  She has a past surgical history that includes Other surgical history (09/28/2020) and Other surgical history (09/09/2021).      Social History:  She reports that she has never smoked. She has never been exposed to tobacco smoke. She has never used smokeless tobacco. She reports current alcohol use of about 6.0 standard drinks of alcohol per week. She reports that she does not use drugs.    Family History:  No family history on file.     Allergies:  Patient has no known allergies.    Outpatient Medications:  Current Outpatient Medications   Medication Instructions    albuterol (Ventolin HFA) 90 mcg/actuation inhaler 2 puffs, inhalation, Every 4 hours PRN    amLODIPine (NORVASC) 5 mg, oral, Daily    aspirin 81 mg, Daily    atorvastatin (LIPITOR) 80 mg, oral, Daily    escitalopram (Lexapro) 20 mg tablet TAKE 1 TABLET BY MOUTH EVERY DAY    ezetimibe (ZETIA) 10 mg, oral, Daily    furosemide (LASIX) 40 mg, oral, Daily    losartan (COZAAR) 100 mg, oral, Daily    montelukast (Singulair) 10 mg tablet TAKE 1 TABLET BY MOUTH EVERY DAY    Wegovy 0.25 mg, subcutaneous, Weekly     Last  Recorded Vitals:  There were no vitals filed for this visit.    Physical Exam  Patient is alert and oriented x3.  HEENT is unremarkable mucous members are moist  Neck no JVP no bruits upstrokes are full no thyromegaly  Lungs are clear bilaterally.  No wheezing crackles or rales  Heart regular rhythm normal S1-S2 there is no S3 no murmurs are heard.  Abdomen is soft b are positive nontender nondistended no organomegaly no pulsatile masses  Extremities have 1+ edema.  Distal pulses present palpable.  Neuro is grossly nonfocal  Skin has no rashes     Last Labs:  CBC -  Lab Results   Component Value Date    WBC 10.1 09/16/2022    HGB 12.3 09/16/2022    HCT 38.3 09/16/2022    MCV 95 09/16/2022     09/16/2022     CMP -  Lab Results   Component Value Date    CALCIUM 9.1 02/26/2025    PROT 6.8 02/26/2025    ALBUMIN 4.2 02/26/2025    AST 23 02/26/2025    ALT 17 02/26/2025    ALKPHOS 136 02/26/2025    BILITOT 0.8 02/26/2025     LIPID PANEL -   Lab Results   Component Value Date    CHOL 169 02/26/2025    HDL 58 02/26/2025    CHHDL 2.9 02/26/2025    VLDL 42 (H) 02/26/2024    TRIG 273 (H) 02/26/2025    NHDL 111 02/26/2025     RENAL FUNCTION PANEL -   Lab Results   Component Value Date    K 4.8 02/26/2025     Lab Results   Component Value Date    HGBA1C 6.4 (H) 09/19/2024     Procedure  ECHO [09/28/2022]: EF 65-70%. SD = impaired relaxation pattern. AV sclerosis.     ECHO [03/02/2021]: EF 60-65%. AV sclerosis.     EX NST [03/02/2021]: 6 min 31 sec (7.70 METs) . . . Normal â€“ 75%     EX NST [11/02/2017]: 5 min 15 sec (7 METs) ... Normal â€“ 69%     TST [04/15/2014]: Positive â€“ hypertensive response to exercise w/BP rising to 210/88     CT [04/15/2014]: 6 mm probable area of focal pleural thickening along the right minor fissure. Nonspecific 4 mm ground-glass nodule RLL      CAC [04/15/2014]: Mildly elev at 26 Agatston units (80th percentile)        Assessment/Plan   1. Hypertension.  Blood pressure is marginally elevated  today.  She checks her blood pressure occasionally at home.  She does not recall what the readings are.  Asked her to check it weekly and write the results down.    2. CAD. Mildly elevated calcium score 25 units in 2014. Continue statin and Zetia and aspirin. Stress test 2021 normal.  I believe the shortness Repetab is primarily deconditioning and not due to artery EKG reveals no changes.      3. Hyperlipidemia. LDL is gone up from 63 up to 82. HDL is also gone from 46-54. Blood sugars are 118 and subsequently triglycerides are 226.  2/26/2025 LDL 75 HDL 58 triglycerides 273 blood sugar 103 LFTs normal.  Exercise will help with the blood sugar and the triglycerides    4. Murmur. She has aortic sclerosis there is no stenosis.     5. Shortness of breath.  Mostly related to deconditioning and weight gain.    RTC 1 year      Mann Pathak MD     Instructions and follow up

## 2025-03-03 NOTE — PATIENT INSTRUCTIONS
1. Hypertension.  Blood pressure is marginally elevated today.  She checks her blood pressure occasionally at home.  She does not recall what the readings are.  Asked her to check it weekly and write the results down.    2. CAD. Mildly elevated calcium score 25 units in 2014. Continue statin and Zetia and aspirin. Stress test 2021 normal.  I believe the shortness Repetab is primarily deconditioning and not due to artery EKG reveals no changes.      3. Hyperlipidemia. LDL is gone up from 63 up to 82. HDL is also gone from 46-54. Blood sugars are 118 and subsequently triglycerides are 226.  2/26/2025 LDL 75 HDL 58 triglycerides 273 blood sugar 103 LFTs normal.  Exercise will help with the blood sugar and the triglycerides    4. Murmur. She has aortic sclerosis there is no stenosis.     5. Shortness of breath.  Mostly related to deconditioning and weight gain.    RTC 1 year

## 2025-03-05 DIAGNOSIS — R60.0 EDEMA, PERIPHERAL: ICD-10-CM

## 2025-03-05 RX ORDER — FUROSEMIDE 40 MG/1
40 TABLET ORAL DAILY
Qty: 90 TABLET | Refills: 1 | Status: SHIPPED | OUTPATIENT
Start: 2025-03-05

## 2025-03-13 DIAGNOSIS — E78.5 HYPERLIPIDEMIA, UNSPECIFIED HYPERLIPIDEMIA TYPE: ICD-10-CM

## 2025-03-13 RX ORDER — ATORVASTATIN CALCIUM 80 MG/1
80 TABLET, FILM COATED ORAL DAILY
Qty: 90 TABLET | Refills: 1 | Status: SHIPPED | OUTPATIENT
Start: 2025-03-13

## 2025-03-13 RX ORDER — LOSARTAN POTASSIUM 100 MG/1
100 TABLET ORAL DAILY
Qty: 90 TABLET | Refills: 1 | Status: SHIPPED | OUTPATIENT
Start: 2025-03-13

## 2025-03-24 DIAGNOSIS — J45.20 MILD INTERMITTENT ASTHMA, UNSPECIFIED WHETHER COMPLICATED (HHS-HCC): ICD-10-CM

## 2025-03-24 RX ORDER — MONTELUKAST SODIUM 10 MG/1
TABLET ORAL
Qty: 90 TABLET | Refills: 0 | Status: SHIPPED | OUTPATIENT
Start: 2025-03-24

## 2025-05-15 DIAGNOSIS — F41.9 ANXIETY: ICD-10-CM

## 2025-05-15 RX ORDER — ESCITALOPRAM OXALATE 20 MG/1
20 TABLET ORAL DAILY
Qty: 90 TABLET | Refills: 0 | Status: SHIPPED | OUTPATIENT
Start: 2025-05-15

## 2025-05-20 ENCOUNTER — APPOINTMENT (OUTPATIENT)
Dept: PRIMARY CARE | Facility: CLINIC | Age: 60
End: 2025-05-20
Payer: COMMERCIAL

## 2025-05-20 VITALS
OXYGEN SATURATION: 88 % | SYSTOLIC BLOOD PRESSURE: 109 MMHG | WEIGHT: 293 LBS | HEART RATE: 87 BPM | BODY MASS INDEX: 41.95 KG/M2 | DIASTOLIC BLOOD PRESSURE: 64 MMHG | HEIGHT: 70 IN

## 2025-05-20 DIAGNOSIS — J44.9 CHRONIC OBSTRUCTIVE PULMONARY DISEASE, UNSPECIFIED COPD TYPE (MULTI): ICD-10-CM

## 2025-05-20 DIAGNOSIS — I25.10 CORONARY ARTERY DISEASE INVOLVING NATIVE CORONARY ARTERY OF NATIVE HEART WITHOUT ANGINA PECTORIS: Chronic | ICD-10-CM

## 2025-05-20 DIAGNOSIS — I10 BENIGN ESSENTIAL HYPERTENSION: Primary | Chronic | ICD-10-CM

## 2025-05-20 DIAGNOSIS — E78.00 HYPERCHOLESTEROLEMIA: Chronic | ICD-10-CM

## 2025-05-20 PROCEDURE — 3008F BODY MASS INDEX DOCD: CPT | Performed by: FAMILY MEDICINE

## 2025-05-20 PROCEDURE — 3074F SYST BP LT 130 MM HG: CPT | Performed by: FAMILY MEDICINE

## 2025-05-20 PROCEDURE — 99214 OFFICE O/P EST MOD 30 MIN: CPT | Performed by: FAMILY MEDICINE

## 2025-05-20 PROCEDURE — 3078F DIAST BP <80 MM HG: CPT | Performed by: FAMILY MEDICINE

## 2025-05-20 RX ORDER — FLUTICASONE PROPIONATE AND SALMETEROL 250; 50 UG/1; UG/1
1 POWDER RESPIRATORY (INHALATION)
Qty: 60 EACH | Refills: 11 | Status: SHIPPED | OUTPATIENT
Start: 2025-05-20 | End: 2026-05-20

## 2025-05-20 ASSESSMENT — ENCOUNTER SYMPTOMS
MUSCULOSKELETAL NEGATIVE: 1
NEUROLOGICAL NEGATIVE: 1
SHORTNESS OF BREATH: 1
CONSTITUTIONAL NEGATIVE: 1

## 2025-05-20 NOTE — PROGRESS NOTES
Subjective   Patient ID: Ashley Perez is a 59 y.o. female who presents for Leg Problem (Left lower leg draining) and Shortness of Breath.  Shortness of Breath  Associated symptoms include leg swelling.   Swelling le  Chf  Sleep apnea  obesity    Review of Systems   Constitutional: Negative.    HENT: Negative.     Respiratory:  Positive for shortness of breath.    Cardiovascular:  Positive for leg swelling.   Genitourinary: Negative.    Musculoskeletal: Negative.    Neurological: Negative.        Objective   Physical Exam  Constitutional:       Appearance: Normal appearance.   HENT:      Head: Normocephalic and atraumatic.      Mouth/Throat:      Mouth: Mucous membranes are moist.   Eyes:      Extraocular Movements: Extraocular movements intact.      Pupils: Pupils are equal, round, and reactive to light.   Cardiovascular:      Rate and Rhythm: Normal rate and regular rhythm.   Skin:     General: Skin is warm.   Neurological:      Mental Status: She is alert.         Assessment/Plan   Problem List Items Addressed This Visit           ICD-10-CM    Benign essential hypertension - Primary (Chronic) I10    Relevant Orders    Comprehensive Metabolic Panel    Lipid Panel    Hemoglobin A1C    Coronary artery disease (Chronic) I25.10    Relevant Orders    Comprehensive Metabolic Panel    Lipid Panel    Hemoglobin A1C    Hypercholesterolemia (Chronic) E78.00    Relevant Orders    Comprehensive Metabolic Panel    Lipid Panel    Hemoglobin A1C     Other Visit Diagnoses         Codes      Chronic obstructive pulmonary disease, unspecified COPD type (Multi)     J44.9    Relevant Medications    fluticasone propion-salmeteroL (Advair Diskus) 250-50 mcg/dose diskus inhaler                 Luke Roy DO 05/20/25 3:26 PM

## 2025-05-29 LAB
ALBUMIN SERPL-MCNC: 4.3 G/DL (ref 3.6–5.1)
ALP SERPL-CCNC: 115 U/L (ref 37–153)
ALT SERPL-CCNC: 17 U/L (ref 6–29)
ANION GAP SERPL CALCULATED.4IONS-SCNC: 12 MMOL/L (CALC) (ref 7–17)
AST SERPL-CCNC: 21 U/L (ref 10–35)
BILIRUB SERPL-MCNC: 0.5 MG/DL (ref 0.2–1.2)
BNP SERPL-MCNC: 84 PG/ML
BUN SERPL-MCNC: 23 MG/DL (ref 7–25)
CALCIUM SERPL-MCNC: 9.4 MG/DL (ref 8.6–10.4)
CHLORIDE SERPL-SCNC: 100 MMOL/L (ref 98–110)
CHOLEST SERPL-MCNC: 172 MG/DL
CHOLEST/HDLC SERPL: 3.1 (CALC)
CO2 SERPL-SCNC: 30 MMOL/L (ref 20–32)
CREAT SERPL-MCNC: 0.92 MG/DL (ref 0.5–1.05)
EGFRCR SERPLBLD CKD-EPI 2021: 71 ML/MIN/1.73M2
EST. AVERAGE GLUCOSE BLD GHB EST-MCNC: 143 MG/DL
EST. AVERAGE GLUCOSE BLD GHB EST-SCNC: 7.9 MMOL/L
GLUCOSE SERPL-MCNC: 116 MG/DL (ref 65–99)
HBA1C MFR BLD: 6.6 %
HDLC SERPL-MCNC: 55 MG/DL
LDLC SERPL CALC-MCNC: 85 MG/DL (CALC)
NONHDLC SERPL-MCNC: 117 MG/DL (CALC)
POTASSIUM SERPL-SCNC: 5.3 MMOL/L (ref 3.5–5.3)
PROT SERPL-MCNC: 6.5 G/DL (ref 6.1–8.1)
SODIUM SERPL-SCNC: 142 MMOL/L (ref 135–146)
TRIGL SERPL-MCNC: 230 MG/DL

## 2025-06-03 ENCOUNTER — APPOINTMENT (OUTPATIENT)
Dept: PRIMARY CARE | Facility: CLINIC | Age: 60
End: 2025-06-03
Payer: COMMERCIAL

## 2025-06-03 VITALS
HEART RATE: 86 BPM | OXYGEN SATURATION: 92 % | SYSTOLIC BLOOD PRESSURE: 141 MMHG | HEIGHT: 70 IN | DIASTOLIC BLOOD PRESSURE: 76 MMHG | WEIGHT: 293 LBS | BODY MASS INDEX: 41.95 KG/M2

## 2025-06-03 DIAGNOSIS — G47.30 SLEEP APNEA, UNSPECIFIED TYPE: ICD-10-CM

## 2025-06-03 DIAGNOSIS — I25.10 CORONARY ARTERY DISEASE INVOLVING NATIVE CORONARY ARTERY OF NATIVE HEART WITHOUT ANGINA PECTORIS: Primary | ICD-10-CM

## 2025-06-03 DIAGNOSIS — E11.9 TYPE 2 DIABETES MELLITUS WITHOUT COMPLICATION, WITHOUT LONG-TERM CURRENT USE OF INSULIN: ICD-10-CM

## 2025-06-03 DIAGNOSIS — E66.01 MORBID OBESITY (MULTI): ICD-10-CM

## 2025-06-03 DIAGNOSIS — R60.0 EDEMA, PERIPHERAL: ICD-10-CM

## 2025-06-03 PROCEDURE — 3077F SYST BP >= 140 MM HG: CPT | Performed by: FAMILY MEDICINE

## 2025-06-03 PROCEDURE — 3078F DIAST BP <80 MM HG: CPT | Performed by: FAMILY MEDICINE

## 2025-06-03 PROCEDURE — 99214 OFFICE O/P EST MOD 30 MIN: CPT | Performed by: FAMILY MEDICINE

## 2025-06-03 PROCEDURE — 4010F ACE/ARB THERAPY RXD/TAKEN: CPT | Performed by: FAMILY MEDICINE

## 2025-06-03 PROCEDURE — 3008F BODY MASS INDEX DOCD: CPT | Performed by: FAMILY MEDICINE

## 2025-06-03 ASSESSMENT — ENCOUNTER SYMPTOMS
SHORTNESS OF BREATH: 1
NEUROLOGICAL NEGATIVE: 1
CONSTITUTIONAL NEGATIVE: 1
MUSCULOSKELETAL NEGATIVE: 1

## 2025-06-03 NOTE — PROGRESS NOTES
Subjective   Patient ID: Ashley Perez is a 59 y.o. female who presents for Follow-up.  HPI  Pt with sleep apnea obesity and niddm   Also peripheral edema with some heart failure    Review of Systems   Constitutional: Negative.    HENT: Negative.     Respiratory:  Positive for shortness of breath.         Sleep apnea     Cardiovascular:  Positive for leg swelling.   Genitourinary: Negative.    Musculoskeletal: Negative.    Neurological: Negative.        Objective   Physical Exam  Constitutional:       Appearance: Normal appearance.   HENT:      Head: Normocephalic.      Mouth/Throat:      Mouth: Mucous membranes are moist.   Cardiovascular:      Rate and Rhythm: Normal rate and regular rhythm.   Pulmonary:      Effort: Pulmonary effort is normal.   Skin:     General: Skin is warm.   Neurological:      Mental Status: She is alert.         Assessment/Plan            Luke Roy DO 06/03/25 2:54 PM

## 2025-06-25 DIAGNOSIS — J45.20 MILD INTERMITTENT ASTHMA, UNSPECIFIED WHETHER COMPLICATED (HHS-HCC): ICD-10-CM

## 2025-06-26 RX ORDER — MONTELUKAST SODIUM 10 MG/1
10 TABLET ORAL DAILY
Qty: 90 TABLET | Refills: 0 | Status: SHIPPED | OUTPATIENT
Start: 2025-06-26

## 2025-07-15 ENCOUNTER — OFFICE VISIT (OUTPATIENT)
Dept: SLEEP MEDICINE | Facility: CLINIC | Age: 60
End: 2025-07-15
Payer: COMMERCIAL

## 2025-07-15 VITALS
HEIGHT: 70 IN | OXYGEN SATURATION: 94 % | TEMPERATURE: 97.8 F | RESPIRATION RATE: 16 BRPM | HEART RATE: 83 BPM | BODY MASS INDEX: 41.95 KG/M2 | DIASTOLIC BLOOD PRESSURE: 68 MMHG | WEIGHT: 293 LBS | SYSTOLIC BLOOD PRESSURE: 135 MMHG

## 2025-07-15 DIAGNOSIS — G47.33 OBSTRUCTIVE SLEEP APNEA, ADULT: Primary | Chronic | ICD-10-CM

## 2025-07-15 PROCEDURE — 3075F SYST BP GE 130 - 139MM HG: CPT | Performed by: PHYSICIAN ASSISTANT

## 2025-07-15 PROCEDURE — 3078F DIAST BP <80 MM HG: CPT | Performed by: PHYSICIAN ASSISTANT

## 2025-07-15 PROCEDURE — 99213 OFFICE O/P EST LOW 20 MIN: CPT | Performed by: PHYSICIAN ASSISTANT

## 2025-07-15 PROCEDURE — 3008F BODY MASS INDEX DOCD: CPT | Performed by: PHYSICIAN ASSISTANT

## 2025-07-15 ASSESSMENT — ENCOUNTER SYMPTOMS
LOSS OF SENSATION IN FEET: 1
OCCASIONAL FEELINGS OF UNSTEADINESS: 1
DEPRESSION: 0

## 2025-07-15 ASSESSMENT — PAIN SCALES - GENERAL: PAINLEVEL_OUTOF10: 0-NO PAIN

## 2025-07-15 NOTE — PATIENT INSTRUCTIONS
[facility] Sleep Medicine  [address] MEDICAL [facility] [address]  [address] [address]       NAME: Ashley May   DATE: 07/15/25    Your Sleep Provider Today: Hillary Sorto PA-C  Your Primary Care Physician: Luke Roy, DO   Your Referring Provider: No ref. provider found    DIAGNOSIS:   No diagnosis found.    Thank you for coming to the Sleep Medicine Clinic today! Your sleep medicine provider today was: Hillary Sorto PA-C Below is a summary of your treatment plan, other important information, and our contact numbers:      TREATMENT PLAN     **Turn off Temperature + Humidity settings (make sure your tube is connected to device for both options to show up) if you do not want to use distilled  water; [facility] can walk you through if any questions     Instructions - Common ANNE-MARIE Recs: - For your sleep apnea, continue to use your PAP every night and use it whenever you are sleeping.   - Avoid alcohol or sedatives several hours prior to sleeping.   - Get additional supplies for your PAP (e.g., mask, hose, filters) every 3 months or as your insurance allows from your [employer] company. Replacement cushions for your PAP mask can be requested monthly if airseals are an issue.  - Remember to clean your mask, tubings, and water chamber regularly as instructed.  - Avoid driving or operating heavy machinery when drowsy. A person driving while sleepy is five (5) times more likely to have an accident. If you feel sleepy, pull over and take a short power nap (sleep for less than 30 minutes). Otherwise, ask somebody to drive you.      Return to clinic 1 year       IMPORTANT INFORMATION     Call 911 for medical emergencies.  Our offices are generally open from Monday-Friday, 9 am - 5 pm.  If you need to get in touch with me, you may either call me and my team(number is below) or you can use [employer].  If a referral for a test, for CPAP, or for another specialist was made, and you have not  heard about scheduling this within a week, please call scheduling at [phone] ([phone]).  If you are unable to make your appointment for clinic or an overnight study, kindly call the office at least 48 hours in advance to cancel and reschedule.  If you are on CPAP, please bring your device's card or the device to each clinic appointment.   There are no supporting services by either the sleep doctors or their staff on weekends and Holidays, or after 5 PM on weekdays.   If you have been asked to come to a sleep study, make sure you bring toiletries, a comfy pillow, and any nighttime medications that you may regularly take. Also be sure to eat dinner before you arrive. We generally do not provide meals.      PRESCRIPTIONS     We require 7 days advanced notice for prescription refills. If we do not receive the request in this time, we cannot guarantee that your medication will be refilled in time.      IMPORTANT PHONE NUMBERS     Sleep Medicine Clinic Fax: [phone]  Appointments (for Adult Sleep Clinic): [phone] - option 2  Appointments (For Sleep Studies): [phone] - option 3  [facility] (DME): [phone]  Behavioral Sleep Medicine: [phone]  Sleep Surgery: [phone]  ENT (Otolaryngology): [phone]  Headache Clinic (Neurology): [phone]  Neurology: [phone]  Psychiatry: [phone]  Pulmonary Function Testing (PFT) Center: [phone]  Pulmonary Medicine: [phone]    [facility] ([employer]): [phone]  alike ([employer]): [phone]  [facility] ([facility]): [phone]      OUR ADULT SLEEP MEDICINE TEAM   Please do not hesitate to call the office or sleep nurse with any questions between appointments:    Adult Sleep Nurses (Kami Parsons RN and Katja Thomas RN):  For clinical questions and refilling prescriptions: [phone]  Email sleep diaries and other documents at: [email or web address]    Adult Sleep  Medicine Secretaries:  Karla Lynn (For Kaity/Guido/Krise/Strohl/Yeh/Bishop):   P: [phone]  F: [phone]  Yessica Lemon (For Phipps/Alberto): P: [phone]  Fax: [phone]  Mary Miller (For Jurcevic/Blank): P: [phone]  F: [phone]  Katy Anne-Marie (For Lamberto): P: [phone]  F: [phone]  Carey George (For Jennie/Brigido/Zakhary): P: [phone]  F: [phone]  Sosa Yen (For Tyler/Mistry): P: [phone]  F: [phone]     Adult Sleep Medicine Advanced Practice Providers:  Jasmeet Hale ([place], [place])  Rosi Fofana ([facility])  Milvia Dominguez CNP ([place], [place], [place])  Ananya Sorto CNP ([place], [place], [place])  Vivi Mistry CNP ([place], [place])        OUR SLEEP TESTING LOCATIONS     Our team will contact you to schedule your sleep study, however, you can contact us as follow:  Main Phone Line (scheduling only): [phone]), option 3  Adult and Pediatric Locations  [facility] (6 years and older): [facility] - 4th floor ([facility]) After hours line: [phone]  [facility] (Main campus: All ages): [facility], 6th floor. After hours line: [phone]   [place] (5 years and older; younger considered on case-by-case basis): [address]; [address], [address]. Scheduling  After hours line: [phone]   [place] (6 years and older): [address]; [address]   [place] (6 years and older): [address], [address]  After hours line: [phone]   [facility] (13 years and older) in [place]: [address], 2nd floor  After hours line: [phone]   [place] (13 year and older): [address]  After hours line: [phone]     Adult Only Locations:   [place] (18 years and older): [address] [facility] [facility], 2nd floor   [place] (18 years and older): [address] [facility]  [address]; 4th floor  After hours line: [phone]   [place] (18 years and older) at [place]: [address] [place] [address]  After hours line: [phone]          CONTACTING YOUR SLEEP MEDICINE PROVIDER     Send a message directly to your provider through \"My Chart\", which is the email service through your  Records Account: [email or web address]   Call [phone] and leave a message. One of the administrative assistants will forward the message to your sleep medicine provider through \"My Chart\" and/or email.     Your sleep medicine provider for this visit was: Hillary Sorto PA-C    "

## 2025-07-15 NOTE — PROGRESS NOTES
Patient: Ashley Perez    [id number]  : 1965 -- AGE 60 y.o.    Provider: Hillary Sorto PA-C     Location [facility] 1   Service Date: 7/15/2025              [facility] Sleep Medicine Clinic  Followup Visit Note    HISTORY OF PRESENT ILLNESS     HISTORY OF PRESENT ILLNESS   Ashley Perez is a 60 y.o. female with h/o ANNE-MARIE, obesity, HTN, asthma, depression, CAD  who presents to a [facility] Sleep Medicine Clinic for followup.       PAST SLEEP HISTORY:  HSAT 2022  Connie 3%: 61.6  REI4%: 44.4  O2 chuck: 61%      Assessment and plan from last visit: 12/3/2024-   Benign essential hypertension (Chronic) I10        -elevated today, improved on re-check  -asymptomatic  -encourage close follow up with pcp/cardiology           Obstructive sleep apnea, adult - Primary (Chronic) G47.33       -doing well on pap therapy, sleep apnea well-controlled  -Update supply order  -Avoid driving /operating machinery if drowsy  -reviewed compliance guidelines today; discussed setting a reminder alarm/ consistent bedtime routine, she is going to work on this            Relevant Orders     Positive Airway Pressure (PAP) Therapy         Current History    On today's visit, the patient reports continues to do well with cpap, has been diligent about meeting compliance. Doing very well and feeling good benefit from it. Using nasal pillows.  Happy that she is no longer snoring, better daytime energy. No longer taking naps.  Working to continue to improve sleep habits. Was drinking ~2 glasses of wine before bed most nights, is trying to kick this habit.     Sleep schedule:   Bed time: 1-2A  Sleep latency: quickly   Nocturnal Awakenings: 0-1 - back to sleep OK   Wake up:10-11A  TST: 8-9 hours      Naps: none     ESS:  8  ROSY:  11  FOSQ: 36    REVIEW OF SYSTEMS     REVIEW OF SYSTEMS  See HPI; all other ROS were reviewed and negative for compliant      ALLERGIES AND MEDICATIONS      ALLERGIES  Allergies[1]    MEDICATIONS: She has a current medication list which includes the following prescription(s): albuterol - Inhale 2 puffs every 4 hours if needed for wheezing or shortness of breath, amlodipine - TAKE 1 TABLET BY MOUTH EVERY DAY, aspirin - Take 1 tablet (81 mg) by mouth once daily, atorvastatin - TAKE 1 TABLET BY MOUTH EVERY DAY, escitalopram - TAKE 1 TABLET BY MOUTH EVERY DAY, ezetimibe - Take 1 tablet (10 mg) by mouth once daily, fluticasone propion-salmeterol - Inhale 1 puff 2 times a day. Rinse mouth with water after use to reduce aftertaste and incidence of candidiasis. Do not swallow, furosemide - TAKE 1 TABLET BY MOUTH EVERY DAY, losartan - TAKE 1 TABLET BY MOUTH EVERY DAY, montelukast - TAKE 1 TABLET BY MOUTH EVERY DAY, and semaglutide - Inject 0.25 mg under the skin 1 (one) time per week.    PAST MEDICAL HISTORY : She  has a past medical history of Atherosclerotic heart disease of native coronary artery without angina pectoris (05/04/2022), Benign essential hypertension (02/23/2024), Coronary artery disease (02/23/2024), Hypercholesterolemia (02/23/2024), Morbid obesity (Multi) (02/23/2024), and Obstructive sleep apnea, adult (02/23/2024).    PAST SURGICAL HISTORY: She  has a past surgical history that includes Other surgical history (09/28/2020) and Other surgical history (09/09/2021).     FAMILY HISTORY: No changes since previous visit. Otherwise non-contributory as charted.     SOCIAL HISTORY  She  reports that she has been smoking cigarettes. She has been exposed to tobacco smoke. She has never used smokeless tobacco. She reports current alcohol use of about 6.0 standard drinks of alcohol per week. She reports that she does not use drugs.       PHYSICAL EXAM     VITAL SIGNS: There were no vitals taken for this visit.       PREVIOUS WEIGHTS:  Wt Readings from Last 3 Encounters:   06/03/25 137 kg (303 lb)   05/20/25 138 kg (305 lb)   03/03/25 137 kg (302 lb)        Constitutional: Alert and oriented, cooperative, no acute distress  Head: Normocephalic, atraumatic   Cranial Features: No abnormal craniofacial features  Neck: Supple. Trachea midline.  Pulmonary: Non-labored breathing, speaks in full sentences. No cough.    Cardiac: regular rate   Extremities: No clubbing, no edema  Neuromuscular: Cranial nerves grossly intact, no focal deficits      RESULTS/DATA     CARBON DIOXIDE (mmol/L)   Date Value   05/28/2025 30   02/26/2025 32     Bicarbonate (mmol/L)   Date Value   08/06/2024 31   02/26/2024 29   03/27/2023 30   09/16/2022 27   08/22/2022 27       PAP Adherence  PAP Download reviewed?: A PAP adherence download was obtained and data was reviewed personally today in clinic., Screenshot of PAP download is attached below        ASSESSMENT/PLAN     Ms. Perez is a 60 y.o. female and she returns in followup to the [facility] Sleep [facility] for ANNE-MARIE.    Problem List, Orders, Assessment, Recommendations:  Problem List Items Addressed This Visit           ICD-10-CM    Obstructive sleep apnea, adult - Primary (Chronic) G47.33    -meeting compliance, experiencing good benefit from pap therapy  -update supply order with [facility]  -avoid drowsy driving            Relevant Orders    Positive Airway Pressure (PAP) Therapy    Follow Up In Adult Sleep Medicine       Disposition    Return to clinic in 12 months                [1] No Known Allergies

## 2025-07-22 ENCOUNTER — APPOINTMENT (OUTPATIENT)
Dept: PRIMARY CARE | Facility: CLINIC | Age: 60
End: 2025-07-22
Payer: COMMERCIAL

## 2025-07-22 VITALS
HEIGHT: 70 IN | WEIGHT: 293 LBS | OXYGEN SATURATION: 92 % | BODY MASS INDEX: 41.95 KG/M2 | SYSTOLIC BLOOD PRESSURE: 139 MMHG | DIASTOLIC BLOOD PRESSURE: 71 MMHG | HEART RATE: 74 BPM

## 2025-07-22 DIAGNOSIS — E11.9 TYPE 2 DIABETES MELLITUS WITHOUT COMPLICATION, WITHOUT LONG-TERM CURRENT USE OF INSULIN: ICD-10-CM

## 2025-07-22 DIAGNOSIS — J32.9 SINUSITIS, UNSPECIFIED CHRONICITY, UNSPECIFIED LOCATION: ICD-10-CM

## 2025-07-22 DIAGNOSIS — E66.01 MORBID OBESITY (MULTI): Primary | ICD-10-CM

## 2025-07-22 PROCEDURE — 3075F SYST BP GE 130 - 139MM HG: CPT | Performed by: FAMILY MEDICINE

## 2025-07-22 PROCEDURE — 3078F DIAST BP <80 MM HG: CPT | Performed by: FAMILY MEDICINE

## 2025-07-22 PROCEDURE — 99214 OFFICE O/P EST MOD 30 MIN: CPT | Performed by: FAMILY MEDICINE

## 2025-07-22 PROCEDURE — 3008F BODY MASS INDEX DOCD: CPT | Performed by: FAMILY MEDICINE

## 2025-07-22 PROCEDURE — 4010F ACE/ARB THERAPY RXD/TAKEN: CPT | Performed by: FAMILY MEDICINE

## 2025-07-22 RX ORDER — AZITHROMYCIN 250 MG/1
TABLET, FILM COATED ORAL
Qty: 6 TABLET | Refills: 0 | Status: SHIPPED | OUTPATIENT
Start: 2025-07-22 | End: 2025-07-27

## 2025-07-22 ASSESSMENT — ENCOUNTER SYMPTOMS
CONSTITUTIONAL NEGATIVE: 1
COUGH: 1
MUSCULOSKELETAL NEGATIVE: 1
NEUROLOGICAL NEGATIVE: 1

## 2025-07-22 NOTE — PROGRESS NOTES
Subjective   Patient ID: Ashley Perez is a 60 y.o. female who presents for Follow-up and Cough.  Cough      Pt with mult reasons to be on ozempic  Patient has sleep apnea patient has elevated sugars patient has obesity history of cardiac involvement patient's prime candidate for Ozempic or one of the GLP-1 medications will work on getting approved  Review of Systems   Constitutional: Negative.    HENT: Negative.     Respiratory:  Positive for cough.    Genitourinary: Negative.    Musculoskeletal: Negative.    Skin: Negative.    Neurological: Negative.        Objective   Physical Exam  Constitutional:       Appearance: Normal appearance.   HENT:      Head: Normocephalic.      Mouth/Throat:      Mouth: Mucous membranes are moist.     Eyes:      Extraocular Movements: Extraocular movements intact.      Pupils: Pupils are equal, round, and reactive to light.       Cardiovascular:      Rate and Rhythm: Normal rate and regular rhythm.   Pulmonary:      Effort: Pulmonary effort is normal.   Abdominal:      General: Abdomen is flat.     Musculoskeletal:         General: Normal range of motion.     Skin:     General: Skin is warm and dry.     Neurological:      Mental Status: She is alert.         Assessment/Plan   Problem List Items Addressed This Visit           ICD-10-CM    Morbid obesity (Multi) - Primary (Chronic) E66.01    Relevant Orders    Referral to Clinical Pharmacy     Other Visit Diagnoses         Codes      Type 2 diabetes mellitus without complication, without long-term current use of insulin     E11.9    Relevant Orders    Referral to Clinical Pharmacy      Sinusitis, unspecified chronicity, unspecified location     J32.9    Relevant Medications    azithromycin (Zithromax) 250 mg tablet                 Luke Roy DO 07/22/25 2:43 PM

## 2025-07-29 ENCOUNTER — APPOINTMENT (OUTPATIENT)
Dept: PHARMACY | Facility: HOSPITAL | Age: 60
End: 2025-07-29
Payer: COMMERCIAL

## 2025-07-29 ENCOUNTER — TELEPHONE (OUTPATIENT)
Dept: PHARMACY | Facility: HOSPITAL | Age: 60
End: 2025-07-29

## 2025-07-29 DIAGNOSIS — E11.9 TYPE 2 DIABETES MELLITUS WITHOUT COMPLICATION, WITHOUT LONG-TERM CURRENT USE OF INSULIN: Primary | ICD-10-CM

## 2025-07-29 DIAGNOSIS — E11.9 TYPE 2 DIABETES MELLITUS WITHOUT COMPLICATION, WITHOUT LONG-TERM CURRENT USE OF INSULIN: ICD-10-CM

## 2025-07-29 RX ORDER — FERROUS SULFATE 325(65) MG
325 TABLET ORAL
COMMUNITY

## 2025-07-29 RX ORDER — VIT C/E/ZN/COPPR/LUTEIN/ZEAXAN 250MG-90MG
500 CAPSULE ORAL DAILY
COMMUNITY

## 2025-07-29 RX ORDER — CETIRIZINE HYDROCHLORIDE 5 MG/1
5 TABLET, CHEWABLE ORAL DAILY
COMMUNITY

## 2025-07-29 RX ORDER — TIRZEPATIDE 2.5 MG/.5ML
2.5 INJECTION, SOLUTION SUBCUTANEOUS WEEKLY
Qty: 2 ML | Refills: 0 | Status: SHIPPED | OUTPATIENT
Start: 2025-07-29

## 2025-07-29 NOTE — PROGRESS NOTES
"  Clinical Pharmacy Appointment    Patient ID: Ashley Perez is a 60 y.o. female who presents for Weight Management, COPD, and Diabetes.    Pt is here for First appointment.     Referring Provider: Luke Roy DO  PCP: Luke Roy DO  Last visit with PCP: 7/22/2025   Next visit with PCP: not scheduled, encouraged    Subjective   Medication Reconciliation:  Added: Cetirizine, Vitamin B12, Ferrous Sulfate  Not taking: Azithromycin (has not started)  Discontinued: Ozempic (cost)    Drug Interactions  No relevant drug interactions were noted.    Medication System Management  Patient's preferred pharmacy: [facility]  Adherence/Organization: No issues reported  Affordability/Accessibility: No issues reported      HPI  WEIGHT LOSS  BMI Readings from Last 3 Encounters:   07/22/25 43.62 kg/m²   07/15/25 43.80 kg/m²   06/03/25 43.48 kg/m²      Starting weight: 304 lbs. (7/22/2025)  Current weight: 304 lbs.    Weight Goals  Maintenance BMI Goal: 23 to 29.9 reasonable due to age  Maintenance Weight Goal: 160 to 208 lbs.  Patient defined goal(s): \"get healthier to live a better life and feel better\"    Lifestyle  Diet: 2 meals/day. Patient is working on cutting back on carbohydrates  BK: coffee  LN: sandwich (ham, cheese, lettuce, pickles), salad, dinner leftovers, vegetable  DN: protein (chicken), tacos, spaghetti  Snacks: peanut butter and celery, chips and dips, cheese and crackers  Drinks: water, wine (3-4 glasses/week)  Patient is cooking for mother-in-law, college student, , and herself  Has worked with nutritionist/dietician? No  Has worked with weight management? No  Exercise:   Rarely, moving around the house  Patient is interested in workout classes and is motivated to get back to doing that  Is limited by: back pain, joint pain, dyspnea, and neuropathy in feet    Other Potential Contributing Factors  Tobacco use: No  Other drug use: No  Medications that may cause weight gain: Escitalopram  Mental " health: No current concerns  Eating Disorders: Denies Hx of any eating disorder  Comorbidities: Anxiety, Depressed Mood, Hyperlipidemia , Hypertension, Sleep Apnea, and CAD and Type 2 Diabetes    Pertinent PMH Review:  PMH of Pancreatitis: No  PMH of Retinopathy: No  PMH of MTC: No  PMH of MEN2: No    Non-Pharmacological Therapy  Weight loss techniques attempted:  Self-directed dieting: calorie restricting  Commercial weight loss program: Weight Watchers    Pharmacological Therapy  Current Medications: N/a  Clarifications to above regimen: N/a  Adverse Effects: N/a  Previous Medications: Denies     Insurance coverage of weight-loss medications? No, benefit exclusion  Eligible for copay cards/programs? Yes, commercial insurance    PULMONARY ASSESSMENT  Patient has been diagnosed with: COPD  Does patient see pulmonology: No  has not had PFT's completed in last 2 years    Current Regimen  Advair Diskus 250/50 mcg/dose 1 puff 2 times per day  Albuterol 90 mcg/act 2 puffs every 4 hours as needed for wheezing or shortness of breath    Clarifications to above regimen: N/a   Adverse Effects: Denies   Appropriate technique? Unable to assess over the phone, patient has no concerns at this time    Symptom Management  Current symptoms: dyspnea and cough  Exercise capacity: limited d/t back pain and dyspnea    Exacerbation Hx  When was your last hospitalization for an exacerbation? N/a  When was the last time you were treated with antibiotics and/or steroids? Current antibiotics     Immunization History:  Influenza: Date [12/5/2022]  PCV13: Date [N/a]  PPSV23: Date [N/a]  PCV20: Date [N/a]  COVID: Date [12/5/2022]  RSV: Date [N/a]    Smoking History  She has a history of smoking      Objective   Allergies[1]  Social History     Social History Narrative    Not on file      Medication Review  Current Outpatient Medications   Medication Instructions    albuterol (Ventolin HFA) 90 mcg/actuation inhaler 2 puffs, inhalation, Every 4  "hours PRN    amLODIPine (NORVASC) 5 mg, oral, Daily    aspirin 81 mg, Daily    atorvastatin (LIPITOR) 80 mg, oral, Daily    cetirizine (ZYRTEC) 5 mg, Daily    cyanocobalamin (VITAMIN B-12) 500 mcg, Daily    escitalopram (LEXAPRO) 20 mg, oral, Daily    ezetimibe (ZETIA) 10 mg, oral, Daily    ferrous sulfate 325 mg (65 mg elemental) tablet 325 mg of ferrous sulfate, Daily with breakfast    fluticasone propion-salmeteroL (Advair Diskus) 250-50 mcg/dose diskus inhaler 1 puff, inhalation, 2 times daily RT, Rinse mouth with water after use to reduce aftertaste and incidence of candidiasis. Do not swallow.    furosemide (LASIX) 40 mg, oral, Daily    losartan (COZAAR) 100 mg, oral, Daily    montelukast (SINGULAIR) 10 mg, oral, Daily      Vitals  BP Readings from Last 2 Encounters:   07/22/25 139/71   07/15/25 135/68     BMI Readings from Last 1 Encounters:   07/22/25 43.62 kg/m²      Labs  A1C  Lab Results   Component Value Date    HGBA1C 6.6 (H) 05/28/2025    HGBA1C 6.4 (H) 09/19/2024    HGBA1C 6.4 (H) 06/23/2020     BMP  Lab Results   Component Value Date    CALCIUM 9.4 05/28/2025     05/28/2025    K 5.3 05/28/2025    CO2 30 05/28/2025     05/28/2025    BUN 23 05/28/2025    CREATININE 0.92 05/28/2025    EGFR 71 05/28/2025     LFTs  Lab Results   Component Value Date    ALT 17 05/28/2025    AST 21 05/28/2025    ALKPHOS 115 05/28/2025    BILITOT 0.5 05/28/2025     FLP  Lab Results   Component Value Date    TRIG 230 (H) 05/28/2025    CHOL 172 05/28/2025    LDLF 82 03/27/2023    LDLCALC 85 05/28/2025    HDL 55 05/28/2025     Urine Microalbumin  No results found for: \"MICROALBCREA\"  Weight Management  Wt Readings from Last 3 Encounters:   07/22/25 138 kg (304 lb)   07/15/25 138 kg (305 lb 4 oz)   06/03/25 137 kg (303 lb)      There is no height or weight on file to calculate BMI.     Assessment/Plan   Problem List Items Addressed This Visit       Type 2 diabetes mellitus without complication, without long-term " current use of insulin    Patient's goal A1c is < 7%.  Is pt at goal? Yes, 6.6% (2025)  Patient's SMBGs are unavailable today.     Rationale for plan: Patient's A1c continues to increase. Will start patient on GLP1 for blood sugars, weight management, and ANNE-MARIE.    Mounjaro requires prior authorization. Will submit to patients insurance and follow up with patient once there is a determination. KEY: [id number]    Medication Changes:  START  Mounjaro 2.5mg weekly    Future Considerations:  If PA approved, titrate Mounjaro as tolerated    Diabetes Education  Rule of 15: eating ~15 g of carbs when BG less than 80 (half cup juice, 3-4 glucose tabs).  Recognize symptoms of high and low blood sugar.   Eat a realistic healthy diet consisting of fruits, vegetables, fiber, protein food choices on a regular basis and be aware of portion/serving sizes. Reduce carbohydrate consumption and always consume with protein and fat. Avoid foods high in saturated/trans fat, high salt content, and sweets and beverages with added sugars.  Limit alcohol consumption; alcohol may affect your blood sugar and cause hypoglycemia.   Stay active and incorporate ~30 mins of exercise into your daily routine to manage your weight and increase the body's acceptance of insulin.    PATIENT GOALS   Fasting B - 130 mg/dL 2-HR Postprandial BG:   Less than 180 mg/dL A1c:   Less than 7.0 %     Mounjaro Education:  Counseled patient on Mounjaro MOA, expectations, side effects, duration of therapy, administration, and monitoring parameters.  Counseled patient on the benefits of GLP-1ra, such as cardiovascular risk reduction, glycemic control, and weight loss potential.  Provided detailed dosing and administration counseling to ensure proper technique.   Reviewed Mounjaro titration schedule, starting with 2.5 mg once weekly to 5 mg, 7.5mg, 10mg, 12.5mg and if tolerated 15 mg.  Reviewed storage requirements of Mounjaro when not in use, and when to  administer the medication if a dose is missed.  Discussed risks of GLP1ra including risk of pancreatitis, MTC and worsening of DR  Advised patient that they may experience improved satiety after meals and portion sizes of meals may be reduced as doses of Mounjaro increase.            Clinical Pharmacist follow-up: once there is a determination on prior authroization, Telehealth visit    Continue all meds under the continuation of care with the referring provider and clinical pharmacy team.    Thank you,  Soraida Long, PharmD   Clinical Pharmacist  [phone]     Verbal consent to manage patient's drug therapy was obtained from the patient. They were informed they may decline to participate or withdraw from participation in pharmacy services at any time.         [1] No Known Allergies

## 2025-07-29 NOTE — TELEPHONE ENCOUNTER
Prior Authorization Approval    Ashley Perez is a 60 y.o. female who was referred to the Clinical Pharmacy Team by Luke Roy DO. The patient has been APPROVED for prior authorization for Mounjaro . Patient has been contacted regarding the status of their prior authorization.     Key: [id number]   Approval Duration: 1 year  Date of expiration: 7/29/2026    Will send in Mounjaro to patients preferred pharmacy. Patient is leaving to go out of the country on 8/4/2025, and discussed with patient trying to start the dose now, to minimize the side effects she will have on the plane. Or, patient could start medication after vacation. Patient would like to start now, and will do her first injection within the next few days. Discussed with patient to call [facility] with any questions/concerns.    Medication Changes:  START  Mounjaro 2.5mg weekly. Will send to patients preferred pharmacy.    Mounjaro Education:  Counseled patient on Mounjaro MOA, expectations, side effects, duration of therapy, administration, and monitoring parameters.  Counseled patient on the benefits of GLP-1ra, such as cardiovascular risk reduction, glycemic control, and weight loss potential.  Provided detailed dosing and administration counseling to ensure proper technique.   Reviewed Mounjaro titration schedule, starting with 2.5 mg once weekly to 5 mg, 7.5mg, 10mg, 12.5mg and if tolerated 15 mg.  Reviewed storage requirements of Mounjaro when not in use, and when to administer the medication if a dose is missed.  Discussed risks of GLP1ra including risk of pancreatitis, MTC and worsening of DR  Advised patient that they may experience improved satiety after meals and portion sizes of meals may be reduced as doses of Mounjaro increase.    Follow up with the Clinical Pharmacy Team 8/19/2025 at 10:00AM    Continue all meds under the continuation of care with the referring provider and clinical pharmacy team.    Please reach out to the Clinical Pharmacy  Team if there are any further questions.     Verbal consent to manage patient's drug therapy was obtained from patient. They were informed they may decline to participate or withdraw from participation in pharmacy services at any time.    Soraida Long, PharmD  [phone]

## 2025-07-29 NOTE — ASSESSMENT & PLAN NOTE
Patient's goal A1c is < 7%.  Is pt at goal? Yes, 6.6% (2025)  Patient's SMBGs are unavailable today.     Rationale for plan: Patient's A1c continues to increase. Will start patient on GLP1 for blood sugars, weight management, and ANNE-MARIE.    Mounjaro requires prior authorization. Will submit to patients insurance and follow up with patient once there is a determination. KEY: [id number]    Medication Changes:  START  Mounjaro 2.5mg weekly    Future Considerations:  If PA approved, titrate Mounjaro as tolerated    Diabetes Education  Rule of 15: eating ~15 g of carbs when BG less than 80 (half cup juice, 3-4 glucose tabs).  Recognize symptoms of high and low blood sugar.   Eat a realistic healthy diet consisting of fruits, vegetables, fiber, protein food choices on a regular basis and be aware of portion/serving sizes. Reduce carbohydrate consumption and always consume with protein and fat. Avoid foods high in saturated/trans fat, high salt content, and sweets and beverages with added sugars.  Limit alcohol consumption; alcohol may affect your blood sugar and cause hypoglycemia.   Stay active and incorporate ~30 mins of exercise into your daily routine to manage your weight and increase the body's acceptance of insulin.    PATIENT GOALS   Fasting B - 130 mg/dL 2-HR Postprandial BG:   Less than 180 mg/dL A1c:   Less than 7.0 %     Mounjaro Education:  Counseled patient on Mounjaro MOA, expectations, side effects, duration of therapy, administration, and monitoring parameters.  Counseled patient on the benefits of GLP-1ra, such as cardiovascular risk reduction, glycemic control, and weight loss potential.  Provided detailed dosing and administration counseling to ensure proper technique.   Reviewed Mounjaro titration schedule, starting with 2.5 mg once weekly to 5 mg, 7.5mg, 10mg, 12.5mg and if tolerated 15 mg.  Reviewed storage requirements of Mounjaro when not in use, and when to administer the medication if a  dose is missed.  Discussed risks of GLP1ra including risk of pancreatitis, MTC and worsening of DR  Advised patient that they may experience improved satiety after meals and portion sizes of meals may be reduced as doses of Mounjaro increase.

## 2025-07-30 DIAGNOSIS — J40 BRONCHITIS: ICD-10-CM

## 2025-07-31 RX ORDER — ALBUTEROL SULFATE 90 UG/1
2 INHALANT RESPIRATORY (INHALATION) EVERY 4 HOURS PRN
Qty: 8 G | Refills: 3 | Status: SHIPPED | OUTPATIENT
Start: 2025-07-31 | End: 2026-07-31

## 2025-08-10 DIAGNOSIS — F41.9 ANXIETY: ICD-10-CM

## 2025-08-11 RX ORDER — ESCITALOPRAM OXALATE 20 MG/1
20 TABLET ORAL DAILY
Qty: 90 TABLET | Refills: 0 | Status: SHIPPED | OUTPATIENT
Start: 2025-08-11

## 2025-08-19 ENCOUNTER — APPOINTMENT (OUTPATIENT)
Dept: PHARMACY | Facility: HOSPITAL | Age: 60
End: 2025-08-19
Payer: COMMERCIAL

## 2025-08-19 DIAGNOSIS — E11.9 TYPE 2 DIABETES MELLITUS WITHOUT COMPLICATION, WITHOUT LONG-TERM CURRENT USE OF INSULIN: ICD-10-CM

## 2025-08-19 RX ORDER — TIRZEPATIDE 5 MG/.5ML
5 INJECTION, SOLUTION SUBCUTANEOUS WEEKLY
Qty: 2 ML | Refills: 0 | Status: SHIPPED | OUTPATIENT
Start: 2025-08-19

## 2025-08-28 DIAGNOSIS — R60.0 EDEMA, PERIPHERAL: ICD-10-CM

## 2025-08-28 RX ORDER — FUROSEMIDE 40 MG/1
40 TABLET ORAL DAILY
Qty: 90 TABLET | Refills: 1 | Status: SHIPPED | OUTPATIENT
Start: 2025-08-28

## 2025-09-16 ENCOUNTER — APPOINTMENT (OUTPATIENT)
Dept: PHARMACY | Facility: HOSPITAL | Age: 60
End: 2025-09-16
Payer: COMMERCIAL